# Patient Record
Sex: MALE | Race: WHITE | Employment: OTHER
[De-identification: names, ages, dates, MRNs, and addresses within clinical notes are randomized per-mention and may not be internally consistent; named-entity substitution may affect disease eponyms.]

---

## 2017-01-30 RX ORDER — ALLOPURINOL 100 MG/1
TABLET ORAL
Qty: 180 TABLET | Refills: 3 | Status: SHIPPED | OUTPATIENT
Start: 2017-01-30 | End: 2018-05-01 | Stop reason: SDUPTHER

## 2017-02-10 LAB
BUN BLDV-MCNC: 62 MG/DL
CALCIUM SERPL-MCNC: 9.3 MG/DL
CHLORIDE BLD-SCNC: 105 MMOL/L
CO2: 29 MMOL/L
CREAT SERPL-MCNC: 2.31 MG/DL
GFR CALCULATED: NORMAL
GLUCOSE BLD-MCNC: 89 MG/DL
POTASSIUM SERPL-SCNC: 4.7 MMOL/L
SODIUM BLD-SCNC: 142 MMOL/L

## 2017-02-13 DIAGNOSIS — N18.4 SECONDARY DIABETES MELLITUS WITH STAGE 4 CHRONIC KIDNEY DISEASE (HCC): ICD-10-CM

## 2017-02-13 DIAGNOSIS — N18.4 CKD (CHRONIC KIDNEY DISEASE) STAGE 4, GFR 15-29 ML/MIN (HCC): ICD-10-CM

## 2017-02-13 DIAGNOSIS — I12.9 BENIGN HYPERTENSION WITH CKD (CHRONIC KIDNEY DISEASE) STAGE IV (HCC): ICD-10-CM

## 2017-02-13 DIAGNOSIS — E13.22 SECONDARY DIABETES MELLITUS WITH STAGE 4 CHRONIC KIDNEY DISEASE (HCC): ICD-10-CM

## 2017-02-13 DIAGNOSIS — N18.4 BENIGN HYPERTENSION WITH CKD (CHRONIC KIDNEY DISEASE) STAGE IV (HCC): ICD-10-CM

## 2017-02-17 ENCOUNTER — OFFICE VISIT (OUTPATIENT)
Facility: CLINIC | Age: 77
End: 2017-02-17

## 2017-02-17 VITALS
HEART RATE: 50 BPM | RESPIRATION RATE: 20 BRPM | OXYGEN SATURATION: 95 % | BODY MASS INDEX: 36.59 KG/M2 | SYSTOLIC BLOOD PRESSURE: 136 MMHG | DIASTOLIC BLOOD PRESSURE: 76 MMHG | WEIGHT: 255 LBS

## 2017-02-17 DIAGNOSIS — E78.00 PURE HYPERCHOLESTEROLEMIA: ICD-10-CM

## 2017-02-17 DIAGNOSIS — I10 ESSENTIAL HYPERTENSION: Primary | ICD-10-CM

## 2017-02-17 DIAGNOSIS — M1A.9XX0 CHRONIC GOUT WITHOUT TOPHUS, UNSPECIFIED CAUSE, UNSPECIFIED SITE: ICD-10-CM

## 2017-02-17 DIAGNOSIS — E87.5 HYPERKALEMIA: ICD-10-CM

## 2017-02-17 DIAGNOSIS — E11.8 TYPE 2 DIABETES MELLITUS WITH COMPLICATION, WITH LONG-TERM CURRENT USE OF INSULIN (HCC): ICD-10-CM

## 2017-02-17 DIAGNOSIS — N18.4 CKD (CHRONIC KIDNEY DISEASE) STAGE 4, GFR 15-29 ML/MIN (HCC): ICD-10-CM

## 2017-02-17 DIAGNOSIS — Z79.4 TYPE 2 DIABETES MELLITUS WITH COMPLICATION, WITH LONG-TERM CURRENT USE OF INSULIN (HCC): ICD-10-CM

## 2017-02-17 DIAGNOSIS — D50.9 IRON DEFICIENCY ANEMIA, UNSPECIFIED IRON DEFICIENCY ANEMIA TYPE: ICD-10-CM

## 2017-02-17 DIAGNOSIS — J44.9 CHRONIC OBSTRUCTIVE PULMONARY DISEASE, UNSPECIFIED COPD TYPE (HCC): ICD-10-CM

## 2017-02-17 LAB — HBA1C MFR BLD: 7.8 %

## 2017-02-17 PROCEDURE — 83036 HEMOGLOBIN GLYCOSYLATED A1C: CPT | Performed by: FAMILY MEDICINE

## 2017-02-17 PROCEDURE — 99214 OFFICE O/P EST MOD 30 MIN: CPT | Performed by: FAMILY MEDICINE

## 2017-02-17 ASSESSMENT — ENCOUNTER SYMPTOMS
VOMITING: 0
NAUSEA: 0
DIARRHEA: 0
ABDOMINAL PAIN: 0
CONSTIPATION: 0
RHINORRHEA: 0
SHORTNESS OF BREATH: 0
COUGH: 0
ABDOMINAL DISTENTION: 0
CHEST TIGHTNESS: 0
BACK PAIN: 0
SORE THROAT: 0

## 2017-03-06 RX ORDER — SPIRONOLACTONE 25 MG/1
25 TABLET ORAL DAILY
Qty: 90 TABLET | Refills: 3 | Status: SHIPPED | OUTPATIENT
Start: 2017-03-06 | End: 2017-03-07 | Stop reason: SDUPTHER

## 2017-03-07 RX ORDER — SPIRONOLACTONE 25 MG/1
25 TABLET ORAL DAILY
Qty: 90 TABLET | Refills: 3 | Status: SHIPPED | OUTPATIENT
Start: 2017-03-07 | End: 2018-06-26 | Stop reason: SDUPTHER

## 2017-11-09 DIAGNOSIS — I10 ESSENTIAL HYPERTENSION: ICD-10-CM

## 2018-05-01 RX ORDER — ALLOPURINOL 100 MG/1
TABLET ORAL
Qty: 180 TABLET | Refills: 3 | Status: SHIPPED | OUTPATIENT
Start: 2018-05-01 | End: 2019-04-10 | Stop reason: SDUPTHER

## 2018-05-03 PROBLEM — N18.30 CKD (CHRONIC KIDNEY DISEASE) STAGE 3, GFR 30-59 ML/MIN (HCC): Status: ACTIVE | Noted: 2018-05-03

## 2018-06-06 PROBLEM — I48.0 PAROXYSMAL ATRIAL FIBRILLATION (HCC): Status: ACTIVE | Noted: 2018-06-06

## 2018-06-06 PROBLEM — Z95.0 STATUS POST PLACEMENT OF CARDIAC PACEMAKER: Status: ACTIVE | Noted: 2018-06-06

## 2018-06-06 PROBLEM — I50.32 DIASTOLIC DYSFUNCTION WITH CHRONIC HEART FAILURE (HCC): Status: ACTIVE | Noted: 2018-06-06

## 2018-06-06 PROBLEM — I35.0 NONRHEUMATIC AORTIC VALVE STENOSIS: Status: ACTIVE | Noted: 2018-06-06

## 2018-06-26 RX ORDER — SPIRONOLACTONE 25 MG/1
25 TABLET ORAL DAILY
Qty: 90 TABLET | Refills: 3 | Status: SHIPPED | OUTPATIENT
Start: 2018-06-26 | End: 2019-06-12 | Stop reason: ALTCHOICE

## 2018-07-06 ENCOUNTER — OFFICE VISIT (OUTPATIENT)
Dept: FAMILY MEDICINE CLINIC | Age: 78
End: 2018-07-06
Payer: COMMERCIAL

## 2018-07-06 VITALS
BODY MASS INDEX: 36.01 KG/M2 | DIASTOLIC BLOOD PRESSURE: 64 MMHG | OXYGEN SATURATION: 98 % | RESPIRATION RATE: 16 BRPM | SYSTOLIC BLOOD PRESSURE: 126 MMHG | WEIGHT: 251 LBS | TEMPERATURE: 98.4 F | HEART RATE: 84 BPM

## 2018-07-06 DIAGNOSIS — E11.8 TYPE 2 DIABETES MELLITUS WITH COMPLICATION, WITH LONG-TERM CURRENT USE OF INSULIN (HCC): ICD-10-CM

## 2018-07-06 DIAGNOSIS — M1A.9XX0 CHRONIC GOUT WITHOUT TOPHUS, UNSPECIFIED CAUSE, UNSPECIFIED SITE: ICD-10-CM

## 2018-07-06 DIAGNOSIS — E78.00 PURE HYPERCHOLESTEROLEMIA: ICD-10-CM

## 2018-07-06 DIAGNOSIS — I50.32 DIASTOLIC DYSFUNCTION WITH CHRONIC HEART FAILURE (HCC): ICD-10-CM

## 2018-07-06 DIAGNOSIS — Z79.4 TYPE 2 DIABETES MELLITUS WITH COMPLICATION, WITH LONG-TERM CURRENT USE OF INSULIN (HCC): ICD-10-CM

## 2018-07-06 DIAGNOSIS — I35.1 NONRHEUMATIC AORTIC VALVE INSUFFICIENCY: ICD-10-CM

## 2018-07-06 DIAGNOSIS — D50.9 IRON DEFICIENCY ANEMIA, UNSPECIFIED IRON DEFICIENCY ANEMIA TYPE: ICD-10-CM

## 2018-07-06 DIAGNOSIS — I10 ESSENTIAL HYPERTENSION: Primary | ICD-10-CM

## 2018-07-06 DIAGNOSIS — Z95.0 STATUS POST PLACEMENT OF CARDIAC PACEMAKER: ICD-10-CM

## 2018-07-06 DIAGNOSIS — J44.9 CHRONIC OBSTRUCTIVE PULMONARY DISEASE, UNSPECIFIED COPD TYPE (HCC): ICD-10-CM

## 2018-07-06 DIAGNOSIS — N18.30 CKD (CHRONIC KIDNEY DISEASE) STAGE 3, GFR 30-59 ML/MIN (HCC): ICD-10-CM

## 2018-07-06 DIAGNOSIS — I48.0 PAROXYSMAL ATRIAL FIBRILLATION (HCC): ICD-10-CM

## 2018-07-06 DIAGNOSIS — I35.0 NONRHEUMATIC AORTIC VALVE STENOSIS: ICD-10-CM

## 2018-07-06 PROCEDURE — 99214 OFFICE O/P EST MOD 30 MIN: CPT | Performed by: FAMILY MEDICINE

## 2018-07-06 ASSESSMENT — ENCOUNTER SYMPTOMS
VOMITING: 0
COUGH: 0
DIARRHEA: 0
RHINORRHEA: 0
BACK PAIN: 0
NAUSEA: 0
CHEST TIGHTNESS: 0
ABDOMINAL DISTENTION: 0
SORE THROAT: 0
SHORTNESS OF BREATH: 1
CONSTIPATION: 0
ABDOMINAL PAIN: 0

## 2018-07-06 NOTE — PROGRESS NOTES
(1 of 2 - 2 Dose Series) 09/05/2018 (Originally 5/14/1988)    Diabetic foot exam  12/07/2018 (Originally 8/30/2014)    DTaP/Tdap/Td vaccine (1 - Tdap) 11/18/2021 (Originally 5/14/1959)    Flu vaccine (1) 09/01/2018    Potassium monitoring  05/31/2019    Creatinine monitoring  05/31/2019    Pneumococcal high/highest risk  Completed     Patient/Caregiver verbalize understanding of medications. Yes    Patient ID: Latosha Ayers is a [de-identified] y.o. male. HPI  Pt here today for follow up on his DM, gout, HTN, COPD, CKD and general follow up after his NILO. He states he did see improvement in his right foot after starting the Prednisone. He still has some mild swelling, but no pain. His BS's were elevated while on the Prednisone, but now back down in the 100-130's. He is taking 40U of the Levemir. He has an appt with Dr. Perkins Ramez the end of the month to go over the NILO. He is using the Anora and hasn't seen much difference in his breathing. Pt denies any fever or chills. Pt denies any N/V/D/C or abdominal pain. Pt today denies any HA, chest pain, but does get SOB. Otherwise pt doing well on current tx and no other concerns today. The patient's past medical, surgical, social, and family history as well as his current medications and allergies were reviewed as documented in today's encounter.       Current Outpatient Prescriptions   Medication Sig Dispense Refill    blood glucose test strips (TRUE METRIX BLOOD GLUCOSE TEST) strip 1 each by In Vitro route daily Testing 1 time daily 200 each 11    ONE TOUCH LANCETS MISC Daily and as needed  Diagnosis 250.0 200 each 11    spironolactone (ALDACTONE) 25 MG tablet Take 1 tablet by mouth daily 90 tablet 3    amLODIPine (NORVASC) 5 MG tablet Take 1 tablet by mouth daily Per Dr. Asia Quezada 90 tablet 3    umeclidinium-vilanterol (ANORO ELLIPTA) 62.5-25 MCG/INH AEPB inhaler Inhale 1 puff into the lungs daily 1 each 11    insulin detemir (LEVEMIR FLEXTOUCH) 100 UNIT/ML injection pen Inject 40 Units into the skin nightly      colchicine (COLCRYS) 0.6 MG tablet Take 1 tab BID prn gout attacks 30 tablet 3    apixaban (ELIQUIS) 2.5 MG TABS tablet Take 2.5 mg by mouth 2 times daily      allopurinol (ZYLOPRIM) 100 MG tablet TAKE 2 TABLETS DAILY 180 tablet 3    hydrALAZINE (APRESOLINE) 100 MG tablet Take 1 tablet by mouth 2 times daily 180 tablet 3    atorvastatin (LIPITOR) 20 MG tablet Take 1 tablet by mouth daily 30 tablet 11    furosemide (LASIX) 40 MG tablet Take 1 tablet by mouth daily 90 tablet 3     No current facility-administered medications for this visit. Review of Systems   Constitutional: Negative for appetite change, fatigue and fever. HENT: Negative for congestion, ear pain, rhinorrhea and sore throat. Respiratory: Positive for shortness of breath. Negative for cough and chest tightness. Cardiovascular: Negative for chest pain and palpitations. Gastrointestinal: Negative for abdominal distention, abdominal pain, constipation, diarrhea, nausea and vomiting. Genitourinary: Negative for difficulty urinating and dysuria. Musculoskeletal: Positive for joint swelling (right foot). Negative for arthralgias, back pain and myalgias. Skin: Negative for rash. Neurological: Negative for dizziness, weakness, light-headedness and headaches. Hematological: Negative for adenopathy. Psychiatric/Behavioral: Negative for behavioral problems and sleep disturbance. The patient is not nervous/anxious. Objective:   Physical Exam   Constitutional: He is oriented to person, place, and time. He appears well-developed. No distress. HENT:   Head: Normocephalic and atraumatic. Right Ear: External ear normal.   Left Ear: External ear normal.   Nose: Nose normal.   Mouth/Throat: Oropharynx is clear and moist. No oropharyngeal exudate. Eyes: Conjunctivae and EOM are normal. Pupils are equal, round, and reactive to light. Neck: Normal range of motion. Cardiovascular: Normal rate, regular rhythm and intact distal pulses. Murmur heard. Systolic murmur is present with a grade of 3/6   Pulmonary/Chest: Effort normal and breath sounds normal. No respiratory distress. He has no wheezes. He exhibits no tenderness. Abdominal: Soft. Bowel sounds are normal. He exhibits no distension and no mass. There is no tenderness. Musculoskeletal: Normal range of motion. He exhibits no edema or tenderness. Lymphadenopathy:     He has no cervical adenopathy. Neurological: He is alert and oriented to person, place, and time. He has normal reflexes. Skin: No rash noted. Psychiatric: He has a normal mood and affect. His behavior is normal.   Vitals reviewed. Assessment:       Diagnosis Orders   1. Essential hypertension  CBC    Basic Metabolic Panel   2. Pure hypercholesterolemia     3. Type 2 diabetes mellitus with complication, with long-term current use of insulin (HCC)  Basic Metabolic Panel    Hemoglobin A1C   4. Diastolic dysfunction with chronic heart failure (HCC)  Basic Metabolic Panel   5. Paroxysmal atrial fibrillation (HCC)     6. Chronic obstructive pulmonary disease, unspecified COPD type ([address]  Basic Metabolic Panel   7. Iron deficiency anemia, unspecified iron deficiency anemia type  CBC   8. Chronic gout without tophus, unspecified cause, unspecified site  Uric Acid   9. CKD (chronic kidney disease) stage 3, GFR 30-59 ml/min  Basic Metabolic Panel   10. Status post placement of cardiac pacemaker     11. Nonrheumatic aortic valve stenosis     12.  Nonrheumatic aortic valve insufficiency             Plan:      Orders Placed This Encounter   Procedures    CBC     Standing Status:   Future     Standing Expiration Date:   7/6/2019    Basic Metabolic Panel     Standing Status:   Future     Standing Expiration Date:   7/6/2019    Hemoglobin A1C     Standing Status:   Future     Standing Expiration Date:   7/6/2019    Uric Acid     Standing Status: Future     Standing Expiration Date:   2019      Orders Placed This Encounter   Medications    blood glucose test strips (TRUE METRIX BLOOD GLUCOSE TEST) strip     Si each by In Vitro route daily Testing 1 time daily     Dispense:  200 each     Refill:  11    ONE TOUCH LANCETS MISC     Sig: Daily and as needed  Diagnosis 250.0     Dispense:  200 each     Refill:  11     Continue with ADA diet, current diabetic meds, and monitoring BS's as instructed and will cont with the Levemir 40u daily and will call with BS's in 2 weeks    Increase fluids throughout the day    Will cont with the Anora daily for his COPD    Will cont to follow with Dr. Amarilis Bowen as instructed the end of the month to go over his NILO    Will cont to follow with Dr. Cassandra Zuniga as instructed    Rest of systems unchanged, continue current treatments. Medications, labs, diagnostic studies, consultations and follow-up as documented in this encounter.  Rest of systems unchanged, continue current treatments

## 2018-07-13 ENCOUNTER — TELEPHONE (OUTPATIENT)
Dept: FAMILY MEDICINE CLINIC | Age: 78
End: 2018-07-13

## 2018-07-13 NOTE — TELEPHONE ENCOUNTER
Patient called on Friday morning reporting his Blood sugar readings, he stated he was suppose to call with results. 7/8-121,7/9-175,7/10-93,7/,7/,7/13-96. These were before he ate when he awoke in the morning. He also states the high for the week on his blood pressure was 147/77 and the lowest reading was 118/65.

## 2018-07-26 ENCOUNTER — TELEPHONE (OUTPATIENT)
Dept: FAMILY MEDICINE CLINIC | Age: 78
End: 2018-07-26

## 2018-08-13 ENCOUNTER — TELEPHONE (OUTPATIENT)
Dept: FAMILY MEDICINE CLINIC | Age: 78
End: 2018-08-13

## 2018-08-14 NOTE — TELEPHONE ENCOUNTER
Patient came in for samples and I did discuss with him what the doctor said. I also asked that he let us know how his BS are doing.

## 2018-08-14 NOTE — TELEPHONE ENCOUNTER
I'm not really sure why they do not think cardiac related and due to his kidneys? When pt picks up his samples tell him to give us a call if the SOB worsens or if he sees no improvement with the new Stiolto Inhaler.

## 2018-08-15 LAB
BUN BLDV-MCNC: 58 MG/DL
CALCIUM SERPL-MCNC: 9.6 MG/DL
CHLORIDE BLD-SCNC: 101 MMOL/L
CO2: 28 MMOL/L
CREAT SERPL-MCNC: 2.34 MG/DL
GFR CALCULATED: 24
GLUCOSE BLD-MCNC: 139 MG/DL
POTASSIUM SERPL-SCNC: 4.2 MMOL/L
SODIUM BLD-SCNC: 139 MMOL/L

## 2018-08-16 DIAGNOSIS — N18.30 CKD (CHRONIC KIDNEY DISEASE) STAGE 3, GFR 30-59 ML/MIN (HCC): ICD-10-CM

## 2018-08-20 ENCOUNTER — TELEPHONE (OUTPATIENT)
Dept: FAMILY MEDICINE CLINIC | Age: 78
End: 2018-08-20

## 2018-08-21 NOTE — TELEPHONE ENCOUNTER
We did receive a faxed request for surgical clearance and it was filled out and faxed back to their office. Note attached asking that they also get cardiac clearance.

## 2018-08-27 RX ORDER — ATORVASTATIN CALCIUM 20 MG/1
20 TABLET, FILM COATED ORAL DAILY
Qty: 90 TABLET | Refills: 3 | Status: SHIPPED | OUTPATIENT
Start: 2018-08-27 | End: 2019-11-05 | Stop reason: SDUPTHER

## 2018-08-28 ENCOUNTER — TELEPHONE (OUTPATIENT)
Dept: FAMILY MEDICINE CLINIC | Age: 78
End: 2018-08-28

## 2018-08-28 NOTE — TELEPHONE ENCOUNTER
Patient requesting a callback to discuss medications. Patient states if you don't get him please leave a message.

## 2018-09-19 PROBLEM — N18.30 CKD (CHRONIC KIDNEY DISEASE) STAGE 3, GFR 30-59 ML/MIN (HCC): Status: RESOLVED | Noted: 2018-05-03 | Resolved: 2018-09-19

## 2018-10-01 LAB
AVERAGE GLUCOSE: 163
BASOPHILS ABSOLUTE: NORMAL /ΜL
BASOPHILS RELATIVE PERCENT: NORMAL %
EOSINOPHILS ABSOLUTE: NORMAL /ΜL
EOSINOPHILS RELATIVE PERCENT: NORMAL %
HBA1C MFR BLD: 7.3 %
HCT VFR BLD CALC: 41.2 % (ref 41–53)
HEMOGLOBIN: 13.9 G/DL (ref 13.5–17.5)
LYMPHOCYTES ABSOLUTE: NORMAL /ΜL
LYMPHOCYTES RELATIVE PERCENT: NORMAL %
MCH RBC QN AUTO: NORMAL PG
MCHC RBC AUTO-ENTMCNC: NORMAL G/DL
MCV RBC AUTO: NORMAL FL
MONOCYTES ABSOLUTE: NORMAL /ΜL
MONOCYTES RELATIVE PERCENT: NORMAL %
NEUTROPHILS ABSOLUTE: NORMAL /ΜL
NEUTROPHILS RELATIVE PERCENT: NORMAL %
PLATELET # BLD: 185 K/ΜL
PMV BLD AUTO: NORMAL FL
RBC # BLD: NORMAL 10^6/ΜL
URIC ACID: 7.8
WBC # BLD: 9.2 10^3/ML

## 2018-10-02 DIAGNOSIS — Z79.4 TYPE 2 DIABETES MELLITUS WITH COMPLICATION, WITH LONG-TERM CURRENT USE OF INSULIN (HCC): ICD-10-CM

## 2018-10-02 DIAGNOSIS — E11.8 TYPE 2 DIABETES MELLITUS WITH COMPLICATION, WITH LONG-TERM CURRENT USE OF INSULIN (HCC): ICD-10-CM

## 2018-10-02 DIAGNOSIS — M1A.9XX0 CHRONIC GOUT WITHOUT TOPHUS, UNSPECIFIED CAUSE, UNSPECIFIED SITE: ICD-10-CM

## 2018-10-02 DIAGNOSIS — D50.9 IRON DEFICIENCY ANEMIA, UNSPECIFIED IRON DEFICIENCY ANEMIA TYPE: ICD-10-CM

## 2018-10-02 DIAGNOSIS — I10 ESSENTIAL HYPERTENSION: ICD-10-CM

## 2018-10-10 ENCOUNTER — OFFICE VISIT (OUTPATIENT)
Dept: FAMILY MEDICINE CLINIC | Age: 78
End: 2018-10-10
Payer: COMMERCIAL

## 2018-10-10 VITALS
HEART RATE: 96 BPM | WEIGHT: 247 LBS | RESPIRATION RATE: 18 BRPM | BODY MASS INDEX: 35.44 KG/M2 | TEMPERATURE: 98.5 F | DIASTOLIC BLOOD PRESSURE: 76 MMHG | OXYGEN SATURATION: 95 % | SYSTOLIC BLOOD PRESSURE: 118 MMHG

## 2018-10-10 DIAGNOSIS — J44.9 CHRONIC OBSTRUCTIVE PULMONARY DISEASE, UNSPECIFIED COPD TYPE (HCC): ICD-10-CM

## 2018-10-10 DIAGNOSIS — M1A.9XX0 CHRONIC GOUT WITHOUT TOPHUS, UNSPECIFIED CAUSE, UNSPECIFIED SITE: ICD-10-CM

## 2018-10-10 DIAGNOSIS — E78.00 PURE HYPERCHOLESTEROLEMIA: ICD-10-CM

## 2018-10-10 DIAGNOSIS — E11.8 TYPE 2 DIABETES MELLITUS WITH COMPLICATION, WITH LONG-TERM CURRENT USE OF INSULIN (HCC): ICD-10-CM

## 2018-10-10 DIAGNOSIS — Z23 NEED FOR PROPHYLACTIC VACCINATION AND INOCULATION AGAINST INFLUENZA: ICD-10-CM

## 2018-10-10 DIAGNOSIS — Z79.4 TYPE 2 DIABETES MELLITUS WITH COMPLICATION, WITH LONG-TERM CURRENT USE OF INSULIN (HCC): ICD-10-CM

## 2018-10-10 DIAGNOSIS — I35.1 NONRHEUMATIC AORTIC VALVE INSUFFICIENCY: ICD-10-CM

## 2018-10-10 DIAGNOSIS — I35.0 NONRHEUMATIC AORTIC VALVE STENOSIS: ICD-10-CM

## 2018-10-10 DIAGNOSIS — I10 ESSENTIAL HYPERTENSION: Primary | ICD-10-CM

## 2018-10-10 DIAGNOSIS — D50.9 IRON DEFICIENCY ANEMIA, UNSPECIFIED IRON DEFICIENCY ANEMIA TYPE: ICD-10-CM

## 2018-10-10 DIAGNOSIS — I48.0 PAROXYSMAL ATRIAL FIBRILLATION (HCC): ICD-10-CM

## 2018-10-10 DIAGNOSIS — I50.32 DIASTOLIC DYSFUNCTION WITH CHRONIC HEART FAILURE (HCC): ICD-10-CM

## 2018-10-10 PROCEDURE — 99214 OFFICE O/P EST MOD 30 MIN: CPT | Performed by: FAMILY MEDICINE

## 2018-10-10 PROCEDURE — 90662 IIV NO PRSV INCREASED AG IM: CPT | Performed by: FAMILY MEDICINE

## 2018-10-10 PROCEDURE — G0008 ADMIN INFLUENZA VIRUS VAC: HCPCS | Performed by: FAMILY MEDICINE

## 2018-10-10 ASSESSMENT — ENCOUNTER SYMPTOMS
SHORTNESS OF BREATH: 1
DIARRHEA: 0
CONSTIPATION: 0
CHEST TIGHTNESS: 0
VOMITING: 0
RHINORRHEA: 0
ABDOMINAL DISTENTION: 0
ABDOMINAL PAIN: 0
BACK PAIN: 0
NAUSEA: 0
COUGH: 0
SORE THROAT: 0

## 2018-10-10 NOTE — PROGRESS NOTES
HYPERTENSION visit     BP Readings from Last 3 Encounters:   10/10/18 118/76   09/19/18 118/80   07/06/18 126/64       LDL Calculated (mg/dL)   Date Value   05/31/2018 58   05/31/2018 58     HDL (mg/dL)   Date Value   05/31/2018 36   05/31/2018 36     BUN (mg/dL)   Date Value   10/01/2018 55     CREATININE (no units)   Date Value   10/01/2018 2.57     Glucose (mg/dL)   Date Value   10/01/2018 81              Have you changed or started any medications since your last visit including any over-the-counter medicines, vitamins, or herbal medicines? yes - see med list   Have you stopped taking any of your medications? Is so, why? -  no  Are you having any side effects from any of your medications? - no  How often do you miss doses of your medication? rare      Have you seen any other physician or provider since your last visit? yes - Isaías Ramirez   Have you had any other diagnostic tests since your last visit? yes - labs   Have you been seen in the emergency room and/or had an admission in a hospital since we last saw you?  no   Have you had your routine dental cleaning in the past 6 months?  no     Do you have an active MyChart account? If no, what is the barrier?   Yes    Patient Care Team:  Tani Doyle MD as PCP - General (Family Medicine)  Jennifer Cruz MD as Consulting Physician (Nephrology)  Fransisco Wu MD as Consulting Physician (Urology)  Vicky Barclay DPM as Consulting Physician (Podiatry)  Miguel Herrmann MD as Consulting Physician (Cardiology)  Chavo Gibson MD as Consulting Physician (Internal Medicine Cardiovascular Disease)  Abimbola Leggett MD as Consulting Physician (Cardiology)    Medical History Review  Past Medical, Family, and Social History reviewed and does contribute to the patient presenting condition    Health Maintenance   Topic Date Due    Diabetic foot exam  12/07/2018 (Originally 8/30/2014)    Shingles Vaccine (1 of 2 - 2 Dose Series)

## 2018-10-17 ENCOUNTER — TELEPHONE (OUTPATIENT)
Dept: FAMILY MEDICINE CLINIC | Age: 78
End: 2018-10-17

## 2018-10-23 ENCOUNTER — TELEPHONE (OUTPATIENT)
Dept: FAMILY MEDICINE CLINIC | Age: 78
End: 2018-10-23

## 2018-12-06 ENCOUNTER — TELEPHONE (OUTPATIENT)
Dept: FAMILY MEDICINE CLINIC | Age: 78
End: 2018-12-06

## 2018-12-06 DIAGNOSIS — N18.4 CHRONIC KIDNEY DISEASE (CKD), STAGE IV (SEVERE) (HCC): Primary | ICD-10-CM

## 2018-12-06 PROBLEM — R09.89 CAROTID BRUIT: Status: ACTIVE | Noted: 2018-07-12

## 2018-12-06 PROBLEM — E16.2 HYPOGLYCEMIA: Status: ACTIVE | Noted: 2018-04-13

## 2018-12-06 PROBLEM — N17.9 ACUTE RENAL FAILURE (HCC): Status: ACTIVE | Noted: 2018-07-12

## 2018-12-06 PROBLEM — I50.9 ACUTE EXACERBATION OF CHF (CONGESTIVE HEART FAILURE) (HCC): Status: ACTIVE | Noted: 2018-10-13

## 2018-12-06 PROBLEM — I44.39 HIGH DEGREE ATRIOVENTRICULAR BLOCK: Status: ACTIVE | Noted: 2018-04-30

## 2018-12-06 PROBLEM — I24.9 ACUTE CORONARY SYNDROME (HCC): Status: ACTIVE | Noted: 2018-07-12

## 2018-12-06 PROBLEM — I44.2 COMPLETE HEART BLOCK (HCC): Status: ACTIVE | Noted: 2018-04-15

## 2018-12-06 PROBLEM — Z95.0 PRESENCE OF CARDIAC PACEMAKER: Status: ACTIVE | Noted: 2018-05-17

## 2018-12-07 DIAGNOSIS — N18.4 CHRONIC KIDNEY DISEASE (CKD), STAGE IV (SEVERE) (HCC): ICD-10-CM

## 2018-12-07 LAB
BUN BLDV-MCNC: 65 MG/DL
CALCIUM SERPL-MCNC: 9.8 MG/DL
CHLORIDE BLD-SCNC: 102 MMOL/L
CO2: 27 MMOL/L
CREAT SERPL-MCNC: 2.29 MG/DL
GFR CALCULATED: NORMAL
GLUCOSE BLD-MCNC: 100 MG/DL
POTASSIUM SERPL-SCNC: 4.9 MMOL/L
SODIUM BLD-SCNC: 138 MMOL/L

## 2019-01-10 ENCOUNTER — OFFICE VISIT (OUTPATIENT)
Dept: FAMILY MEDICINE CLINIC | Age: 79
End: 2019-01-10
Payer: COMMERCIAL

## 2019-01-10 VITALS
SYSTOLIC BLOOD PRESSURE: 112 MMHG | RESPIRATION RATE: 16 BRPM | DIASTOLIC BLOOD PRESSURE: 64 MMHG | HEART RATE: 58 BPM | WEIGHT: 235 LBS | BODY MASS INDEX: 33.72 KG/M2 | TEMPERATURE: 97.7 F | OXYGEN SATURATION: 98 %

## 2019-01-10 DIAGNOSIS — N18.4 CHRONIC KIDNEY DISEASE (CKD), STAGE IV (SEVERE) (HCC): ICD-10-CM

## 2019-01-10 DIAGNOSIS — I65.22 LEFT CAROTID STENOSIS: ICD-10-CM

## 2019-01-10 DIAGNOSIS — D50.9 IRON DEFICIENCY ANEMIA, UNSPECIFIED IRON DEFICIENCY ANEMIA TYPE: ICD-10-CM

## 2019-01-10 DIAGNOSIS — Z79.4 TYPE 2 DIABETES MELLITUS WITH COMPLICATION, WITH LONG-TERM CURRENT USE OF INSULIN (HCC): ICD-10-CM

## 2019-01-10 DIAGNOSIS — I35.0 NONRHEUMATIC AORTIC VALVE STENOSIS: ICD-10-CM

## 2019-01-10 DIAGNOSIS — I35.1 NONRHEUMATIC AORTIC VALVE INSUFFICIENCY: ICD-10-CM

## 2019-01-10 DIAGNOSIS — I10 ESSENTIAL HYPERTENSION: Primary | ICD-10-CM

## 2019-01-10 DIAGNOSIS — J44.9 CHRONIC OBSTRUCTIVE PULMONARY DISEASE, UNSPECIFIED COPD TYPE (HCC): ICD-10-CM

## 2019-01-10 DIAGNOSIS — E11.8 TYPE 2 DIABETES MELLITUS WITH COMPLICATION, WITH LONG-TERM CURRENT USE OF INSULIN (HCC): ICD-10-CM

## 2019-01-10 DIAGNOSIS — E78.00 PURE HYPERCHOLESTEROLEMIA: ICD-10-CM

## 2019-01-10 DIAGNOSIS — M1A.9XX0 CHRONIC GOUT WITHOUT TOPHUS, UNSPECIFIED CAUSE, UNSPECIFIED SITE: ICD-10-CM

## 2019-01-10 DIAGNOSIS — I25.10 CORONARY ARTERY DISEASE INVOLVING NATIVE CORONARY ARTERY OF NATIVE HEART WITHOUT ANGINA PECTORIS: ICD-10-CM

## 2019-01-10 DIAGNOSIS — I48.0 PAF (PAROXYSMAL ATRIAL FIBRILLATION) (HCC): ICD-10-CM

## 2019-01-10 DIAGNOSIS — Z95.0 STATUS POST PLACEMENT OF CARDIAC PACEMAKER: ICD-10-CM

## 2019-01-10 DIAGNOSIS — I50.32 DIASTOLIC DYSFUNCTION WITH CHRONIC HEART FAILURE (HCC): ICD-10-CM

## 2019-01-10 PROBLEM — I24.9 ACUTE CORONARY SYNDROME (HCC): Status: RESOLVED | Noted: 2018-07-12 | Resolved: 2019-01-10

## 2019-01-10 PROBLEM — I44.2 COMPLETE HEART BLOCK (HCC): Status: RESOLVED | Noted: 2018-04-15 | Resolved: 2019-01-10

## 2019-01-10 PROBLEM — I44.39 HIGH DEGREE ATRIOVENTRICULAR BLOCK: Status: RESOLVED | Noted: 2018-04-30 | Resolved: 2019-01-10

## 2019-01-10 PROBLEM — N17.9 ACUTE RENAL FAILURE (HCC): Status: RESOLVED | Noted: 2018-07-12 | Resolved: 2019-01-10

## 2019-01-10 PROBLEM — R09.89 CAROTID BRUIT: Status: RESOLVED | Noted: 2018-07-12 | Resolved: 2019-01-10

## 2019-01-10 PROBLEM — E16.2 HYPOGLYCEMIA: Status: RESOLVED | Noted: 2018-04-13 | Resolved: 2019-01-10

## 2019-01-10 PROBLEM — I50.9 ACUTE EXACERBATION OF CHF (CONGESTIVE HEART FAILURE) (HCC): Status: RESOLVED | Noted: 2018-10-13 | Resolved: 2019-01-10

## 2019-01-10 PROCEDURE — 99214 OFFICE O/P EST MOD 30 MIN: CPT | Performed by: FAMILY MEDICINE

## 2019-01-10 RX ORDER — METOPROLOL SUCCINATE 25 MG/1
25 TABLET, EXTENDED RELEASE ORAL DAILY
COMMUNITY
End: 2019-01-10 | Stop reason: SDUPTHER

## 2019-01-10 RX ORDER — METOPROLOL SUCCINATE 25 MG/1
25 TABLET, EXTENDED RELEASE ORAL DAILY
Qty: 90 TABLET | Refills: 3 | Status: SHIPPED | OUTPATIENT
Start: 2019-01-10 | End: 2020-04-03 | Stop reason: SDUPTHER

## 2019-01-10 RX ORDER — HYDRALAZINE HYDROCHLORIDE 50 MG/1
50 TABLET, FILM COATED ORAL 2 TIMES DAILY
Qty: 180 TABLET | Refills: 3 | Status: SHIPPED | OUTPATIENT
Start: 2019-01-10 | End: 2019-06-12 | Stop reason: ALTCHOICE

## 2019-01-10 RX ORDER — HYDRALAZINE HYDROCHLORIDE 50 MG/1
50 TABLET, FILM COATED ORAL 2 TIMES DAILY
COMMUNITY
End: 2019-01-10 | Stop reason: SDUPTHER

## 2019-01-10 ASSESSMENT — ENCOUNTER SYMPTOMS
ABDOMINAL PAIN: 0
SHORTNESS OF BREATH: 1
BACK PAIN: 0
RHINORRHEA: 0
DIARRHEA: 0
VOMITING: 0
CONSTIPATION: 0
NAUSEA: 0
COUGH: 0
CHEST TIGHTNESS: 0
ABDOMINAL DISTENTION: 0
SORE THROAT: 0

## 2019-01-15 ENCOUNTER — TELEPHONE (OUTPATIENT)
Dept: FAMILY MEDICINE CLINIC | Age: 79
End: 2019-01-15

## 2019-02-13 ENCOUNTER — TELEPHONE (OUTPATIENT)
Dept: FAMILY MEDICINE CLINIC | Age: 79
End: 2019-02-13

## 2019-02-15 ENCOUNTER — TELEPHONE (OUTPATIENT)
Dept: FAMILY MEDICINE CLINIC | Age: 79
End: 2019-02-15

## 2019-04-10 ENCOUNTER — OFFICE VISIT (OUTPATIENT)
Dept: FAMILY MEDICINE CLINIC | Age: 79
End: 2019-04-10
Payer: COMMERCIAL

## 2019-04-10 VITALS
OXYGEN SATURATION: 96 % | BODY MASS INDEX: 34.44 KG/M2 | WEIGHT: 240 LBS | SYSTOLIC BLOOD PRESSURE: 104 MMHG | TEMPERATURE: 97.7 F | HEART RATE: 66 BPM | DIASTOLIC BLOOD PRESSURE: 58 MMHG | RESPIRATION RATE: 18 BRPM

## 2019-04-10 DIAGNOSIS — D50.9 IRON DEFICIENCY ANEMIA, UNSPECIFIED IRON DEFICIENCY ANEMIA TYPE: ICD-10-CM

## 2019-04-10 DIAGNOSIS — N18.4 CHRONIC KIDNEY DISEASE (CKD), STAGE IV (SEVERE) (HCC): ICD-10-CM

## 2019-04-10 DIAGNOSIS — I10 ESSENTIAL HYPERTENSION: Primary | ICD-10-CM

## 2019-04-10 DIAGNOSIS — M1A.9XX0 CHRONIC GOUT WITHOUT TOPHUS, UNSPECIFIED CAUSE, UNSPECIFIED SITE: ICD-10-CM

## 2019-04-10 DIAGNOSIS — I65.22 LEFT CAROTID STENOSIS: ICD-10-CM

## 2019-04-10 DIAGNOSIS — Z79.4 TYPE 2 DIABETES MELLITUS WITH COMPLICATION, WITH LONG-TERM CURRENT USE OF INSULIN (HCC): ICD-10-CM

## 2019-04-10 DIAGNOSIS — I25.10 CORONARY ARTERY DISEASE INVOLVING NATIVE CORONARY ARTERY OF NATIVE HEART WITHOUT ANGINA PECTORIS: ICD-10-CM

## 2019-04-10 DIAGNOSIS — E78.00 PURE HYPERCHOLESTEROLEMIA: ICD-10-CM

## 2019-04-10 DIAGNOSIS — I48.0 PAF (PAROXYSMAL ATRIAL FIBRILLATION) (HCC): ICD-10-CM

## 2019-04-10 DIAGNOSIS — E11.8 TYPE 2 DIABETES MELLITUS WITH COMPLICATION, WITH LONG-TERM CURRENT USE OF INSULIN (HCC): ICD-10-CM

## 2019-04-10 DIAGNOSIS — J44.9 CHRONIC OBSTRUCTIVE PULMONARY DISEASE, UNSPECIFIED COPD TYPE (HCC): ICD-10-CM

## 2019-04-10 DIAGNOSIS — I35.0 NONRHEUMATIC AORTIC VALVE STENOSIS: ICD-10-CM

## 2019-04-10 PROCEDURE — 99214 OFFICE O/P EST MOD 30 MIN: CPT | Performed by: FAMILY MEDICINE

## 2019-04-10 RX ORDER — AMLODIPINE BESYLATE 5 MG/1
5 TABLET ORAL DAILY
Qty: 90 TABLET | Refills: 3 | Status: SHIPPED | OUTPATIENT
Start: 2019-04-10 | End: 2019-06-12 | Stop reason: ALTCHOICE

## 2019-04-10 RX ORDER — ASPIRIN 81 MG/1
81 TABLET ORAL DAILY
COMMUNITY

## 2019-04-10 RX ORDER — ALLOPURINOL 100 MG/1
TABLET ORAL
Qty: 180 TABLET | Refills: 3 | Status: SHIPPED | OUTPATIENT
Start: 2019-04-10 | End: 2020-04-03 | Stop reason: SDUPTHER

## 2019-04-10 ASSESSMENT — ENCOUNTER SYMPTOMS
COUGH: 0
DIARRHEA: 0
ABDOMINAL DISTENTION: 0
RHINORRHEA: 0
VOMITING: 0
BACK PAIN: 1
CONSTIPATION: 0
SHORTNESS OF BREATH: 0
SORE THROAT: 0
ABDOMINAL PAIN: 0
CHEST TIGHTNESS: 0
NAUSEA: 0

## 2019-04-10 ASSESSMENT — PATIENT HEALTH QUESTIONNAIRE - PHQ9
SUM OF ALL RESPONSES TO PHQ9 QUESTIONS 1 & 2: 1
SUM OF ALL RESPONSES TO PHQ QUESTIONS 1-9: 1
1. LITTLE INTEREST OR PLEASURE IN DOING THINGS: 0
SUM OF ALL RESPONSES TO PHQ QUESTIONS 1-9: 1
2. FEELING DOWN, DEPRESSED OR HOPELESS: 1

## 2019-04-10 NOTE — PROGRESS NOTES
HYPERTENSION visit     BP Readings from Last 3 Encounters:   01/10/19 112/64   01/07/19 112/60   10/10/18 118/76       LDL Calculated (mg/dL)   Date Value   01/03/2019 56     HDL (mg/dL)   Date Value   01/03/2019 35     BUN (mg/dL)   Date Value   04/05/2019 75     CREATININE (no units)   Date Value   04/05/2019 2.68     Glucose (mg/dL)   Date Value   01/03/2019 181              Have you changed or started any medications since your last visit including any over-the-counter medicines, vitamins, or herbal medicines? no   Have you stopped taking any of your medications? Is so, why? -  no  Are you having any side effects from any of your medications? - no  How often do you miss doses of your medication? rare      Have you seen any other physician or provider since your last visit? yes - Kaleb Naqvi   Have you had any other diagnostic tests since your last visit? yes - labs   Have you been seen in the emergency room and/or had an admission in a hospital since we last saw you?  no   Have you had your routine dental cleaning in the past 6 months?  no     Do you have an active MyChart account? If no, what is the barrier?   Yes    Patient Care Team:  Sanchez Akhtar MD as PCP - General (Family Medicine)  Clement Calderon MD as Consulting Physician (Nephrology)  John Rodriguez MD as Consulting Physician (Urology)  Sammy Diallo DPM as Consulting Physician (Podiatry)  Janice Ahumada MD as Consulting Physician (Cardiology)  Dano Almonte MD as Consulting Physician (Internal Medicine Cardiovascular Disease)  Mark Meyer MD as Consulting Physician (Cardiology)  Ruby Rodriguez MD as Consulting Physician (Internal Medicine)  Sanchez Ga MD as Surgeon (Vascular Surgery)  Sara Diez MD as Surgeon (Internal Medicine Cardiovascular Disease)  Angela De La Fuente as Consulting Physician (Thoracic Surgery)    Medical History Review  Past Medical, Family, and Social History reviewed and does not contribute to the patient presenting condition    Health Maintenance   Topic Date Due    Shingles Vaccine (1 of 2) 10/10/2019 (Originally 5/14/1988)    Diabetic foot exam  04/10/2020 (Originally 8/30/2014)    DTaP/Tdap/Td vaccine (1 - Tdap) 11/18/2021 (Originally 5/14/1959)    Hepatitis B Vaccine (1 of 3 - Risk 3-dose series) 04/10/2024 (Originally 5/14/1959)    Potassium monitoring  04/05/2020    Creatinine monitoring  04/05/2020    Flu vaccine  Completed    Pneumococcal 65+ years Vaccine  Completed    HPV vaccine  Aged Out     Patient/Caregiver verbalize understanding of medications.   Yes

## 2019-04-10 NOTE — PROGRESS NOTES
Subjective:      Patient ID: Regina Urbina is a [de-identified] y.o. male. HPI  Pt here today for f/u on chronic medical problems:  HTN, HLD, DM, CKD, CAD, COPD, Anemia, OA,go over labs and/or diagnostic studies, and medication refills. Pt is scheduled for CABG/AVR/Left Carotid Endarterectomy on May 1. His BS's have been up and down and has been snacking on some sweets. Pt today denies any HA, chest pain, or worsening SOB. Pt denies any N/V/D/C or abdominal pain. Pt denies any fever or chills. Pt with arthralgias/back pain on and off, but stable on meds. Pt denies any gouty attacks. Otherwise pt doing well on current tx and no other concerns today. The patient's past medical, surgical, social, and family history as well as his current medications and allergies were reviewed as documented in today's encounter.       Current Outpatient Medications   Medication Sig Dispense Refill    aspirin 81 MG EC tablet Take 81 mg by mouth daily      amLODIPine (NORVASC) 5 MG tablet Take 1 tablet by mouth daily Per Dr. Stevie Maciel 90 tablet 3    allopurinol (ZYLOPRIM) 100 MG tablet TAKE 2 TABLETS DAILY 180 tablet 3    metoprolol succinate (TOPROL XL) 25 MG extended release tablet Take 1 tablet by mouth daily 90 tablet 3    hydrALAZINE (APRESOLINE) 50 MG tablet Take 1 tablet by mouth 2 times daily 180 tablet 3    apixaban (ELIQUIS) 2.5 MG TABS tablet Take 1 tablet by mouth 2 times daily 180 tablet 3    Insulin Degludec (TRESIBA FLEXTOUCH) 100 UNIT/ML SOPN Inject 35U daily 1 pen 0    acetaminophen (TYLENOL) 500 MG tablet Take 500 mg by mouth      atorvastatin (LIPITOR) 20 MG tablet Take 1 tablet by mouth daily 90 tablet 3    furosemide (LASIX) 40 MG tablet Take 1 tablet by mouth 2 times daily 180 tablet 3    Tiotropium Bromide-Olodaterol (STIOLTO RESPIMAT) 2.5-2.5 MCG/ACT AERS Take 2 inhalations daily 1 Inhaler 0    spironolactone (ALDACTONE) 25 MG tablet Take 1 tablet by mouth daily 90 tablet 3    acetylcysteine (MUCOMYST) 10 % nebulizer solution Take 6 mLs by mouth 2 times daily Drink 6 mL (mixed with water) Twice. 12 mL 0     No current facility-administered medications for this visit. Review of Systems   Constitutional: Negative for appetite change, chills, fatigue and fever. HENT: Negative for congestion, ear pain, rhinorrhea and sore throat. Respiratory: Negative for cough, chest tightness and shortness of breath. Cardiovascular: Negative for chest pain, palpitations and leg swelling. Gastrointestinal: Negative for abdominal distention, abdominal pain, constipation, diarrhea, nausea and vomiting. Genitourinary: Negative for difficulty urinating and dysuria. Musculoskeletal: Positive for arthralgias and back pain. Negative for myalgias. Skin: Negative for rash. Neurological: Negative for dizziness, weakness, light-headedness and headaches. Hematological: Negative for adenopathy. Psychiatric/Behavioral: Negative for behavioral problems and sleep disturbance. The patient is not nervous/anxious. Objective:   Physical Exam   Constitutional: He is oriented to person, place, and time. He appears well-developed. No distress. HENT:   Head: Normocephalic and atraumatic. Right Ear: External ear normal.   Left Ear: External ear normal.   Nose: Nose normal.   Mouth/Throat: Oropharynx is clear and moist. No oropharyngeal exudate. Eyes: Pupils are equal, round, and reactive to light. Conjunctivae and EOM are normal.   Neck: Normal range of motion. Cardiovascular: Normal rate, regular rhythm and intact distal pulses. Murmur heard. Systolic murmur is present with a grade of 3/6. Pulmonary/Chest: Effort normal and breath sounds normal. No respiratory distress. He has no wheezes. He exhibits no tenderness. Abdominal: Soft. Bowel sounds are normal. He exhibits no distension and no mass. There is no tenderness. Musculoskeletal: Normal range of motion. He exhibits no edema or tenderness. Lymphadenopathy:     He has no cervical adenopathy. Neurological: He is alert and oriented to person, place, and time. He has normal reflexes. Skin: No rash noted. Psychiatric: He has a normal mood and affect. His behavior is normal.   Vitals reviewed. Labs reviewed with pt today. Assessment:       Diagnosis Orders   1. Essential hypertension     2. Pure hypercholesterolemia     3. Type 2 diabetes mellitus with complication, with long-term current use of insulin ([facility]     4. Chronic kidney disease (CKD), stage IV (severe) ([facility]     5. Coronary artery disease involving native coronary artery of native heart without angina pectoris     6. Chronic obstructive pulmonary disease, unspecified COPD type ([facility]     7. Iron deficiency anemia, unspecified iron deficiency anemia type     8. Chronic gout without tophus, unspecified cause, unspecified site     9. Nonrheumatic aortic valve stenosis     10. Left carotid stenosis     11. PAF (paroxysmal atrial fibrillation) ([facility]           Plan:         Orders Placed This Encounter   Medications    amLODIPine (NORVASC) 5 MG tablet     Sig: Take 1 tablet by mouth daily Per Dr. Lizy Estrada:  90 tablet     Refill:  3    allopurinol (ZYLOPRIM) 100 MG tablet     Sig: TAKE 2 TABLETS DAILY     Dispense:  180 tablet     Refill:  3     Continue with ADA diet, current diabetic meds, and monitoring BS's as instructed and increase the [place] to 40U daily    Cont with daily foot exams and yearly eye exams    No Ace-I secondary to CKD    Will cont with low fat/chol diet and Lipitor as ordered    Will be going for CABG/AVR/Left carotid Endarterectomy on May 1 and will follow up after he is discharged    Rest of systems unchanged, continue current treatments. Medications, labs, diagnostic studies, consultations and follow-up as documented in this encounter.  Rest of systems unchanged, continue current treatments        Jose Rodríguez MD

## 2019-04-16 ENCOUNTER — TELEPHONE (OUTPATIENT)
Dept: FAMILY MEDICINE CLINIC | Age: 79
End: 2019-04-16

## 2019-04-16 NOTE — TELEPHONE ENCOUNTER
Patient would like to update you on his daily sugar readings:     4/11: 88  4/12: 77  4/13: 53  4/14: 124  4/15: 70  4/16: 77

## 2019-04-22 ENCOUNTER — TELEPHONE (OUTPATIENT)
Dept: FAMILY MEDICINE CLINIC | Age: 79
End: 2019-04-22

## 2019-04-22 NOTE — TELEPHONE ENCOUNTER
Patient called in  blood sugars: Patient on 35 units.     04/17/(66) 04/18( 117) 72/08(57) 04/20(68) 04/21(77) 04/22(65)

## 2019-05-01 ENCOUNTER — TELEPHONE (OUTPATIENT)
Dept: FAMILY MEDICINE CLINIC | Age: 79
End: 2019-05-01

## 2019-05-01 NOTE — TELEPHONE ENCOUNTER
Dr. Xavier Chaparro called to give update to Dr. Del Diaz. The patient had his OR today, his carotid done per Dr. Bruna Rehman, and Dr. Xavier Chaparro did an AVR and 2 vessel bypass. He did say he had some bleeding with the CEA and the donor site in his leg, but at the moment he is hemodynamically stable and he is in the ICU.

## 2019-05-23 ENCOUNTER — TELEPHONE (OUTPATIENT)
Dept: FAMILY MEDICINE CLINIC | Age: 79
End: 2019-05-23

## 2019-05-23 NOTE — TELEPHONE ENCOUNTER
Patient called to update us on his condition. He was DC'd from [facility] after his cardiac and carotid surgeries and went to [address]. He is getting HD on T-Th-Sat and the johnnie there told him he should get the series of Hep B shots, it's a series of 4-6 shots. I explained that it is a series of 3 immunizations and that they did just do his titers while he was hospitalized and he is fine. He was a little upset to learn that he can't come to the office to see the doctor until he gets out of rehab, but he will call when he knows he is going to be going home. I told him he can call anytime he has questions!

## 2019-06-06 ENCOUNTER — TELEPHONE (OUTPATIENT)
Dept: FAMILY MEDICINE CLINIC | Age: 79
End: 2019-06-06

## 2019-06-06 NOTE — TELEPHONE ENCOUNTER
Patient called to say he got out of the ECF last night and he is not going back no matter what! He is still getting hemodialysis 3 X's/week. He is going to get the Hep B series with them. [facility] Transitions Initial Follow Up Call    Outreach made within 2 business days of discharge: Yes    Patient: Martha Alvarez Patient : 1940   MRN: [id number]  Reason for Admission: No discharge information exists for this patient. Discharge Date:         Spoke with: Patient    Discharge department/facility: 19    TCM Interactive Patient Contact:  Was patient able to fill all prescriptions: Yes  Was patient instructed to bring all medications to the follow-up visit: Yes  Is patient taking all medications as directed in the discharge summary?  Yes  Does patient understand their discharge instructions: Yes  Does patient have questions or concerns that need addressed prior to 7-14 day follow up office visit: no    Scheduled appointment with PCP within 7-14 days    Follow Up  Future Appointments   Date Time Provider Harmeet Cole   7/10/2019  8:15 AM Marton Pea, MD [facility] Alley   2019 12:30 PM Rachel Camejo MD [facility] None       [place], [place]

## 2019-06-06 NOTE — TELEPHONE ENCOUNTER
Patient was recently readmitted to the hospital for a GI bleed. Will not be able to schedule for a transitional visit because he is still in the ECF. Dialysis did call to see why we told the patient he didn't need the hepatitis vaccine and I explained that he was tested at the hospital when he was in.

## 2019-06-07 ENCOUNTER — TELEPHONE (OUTPATIENT)
Dept: FAMILY MEDICINE CLINIC | Age: 79
End: 2019-06-07

## 2019-06-12 ENCOUNTER — OFFICE VISIT (OUTPATIENT)
Dept: FAMILY MEDICINE CLINIC | Age: 79
End: 2019-06-12
Payer: COMMERCIAL

## 2019-06-12 VITALS
WEIGHT: 237 LBS | SYSTOLIC BLOOD PRESSURE: 124 MMHG | BODY MASS INDEX: 35 KG/M2 | DIASTOLIC BLOOD PRESSURE: 72 MMHG | OXYGEN SATURATION: 97 % | HEART RATE: 70 BPM | RESPIRATION RATE: 16 BRPM | TEMPERATURE: 98.9 F

## 2019-06-12 DIAGNOSIS — Z95.0 STATUS POST PLACEMENT OF CARDIAC PACEMAKER: ICD-10-CM

## 2019-06-12 DIAGNOSIS — I48.91 POSTOPERATIVE ATRIAL FIBRILLATION (HCC): ICD-10-CM

## 2019-06-12 DIAGNOSIS — E11.8 TYPE 2 DIABETES MELLITUS WITH COMPLICATION, WITH LONG-TERM CURRENT USE OF INSULIN (HCC): ICD-10-CM

## 2019-06-12 DIAGNOSIS — Z79.4 TYPE 2 DIABETES MELLITUS WITH COMPLICATION, WITH LONG-TERM CURRENT USE OF INSULIN (HCC): ICD-10-CM

## 2019-06-12 DIAGNOSIS — Z95.2 STATUS POST AORTIC VALVE REPLACEMENT: ICD-10-CM

## 2019-06-12 DIAGNOSIS — L60.0 EMBEDDED TOENAIL: ICD-10-CM

## 2019-06-12 DIAGNOSIS — J44.9 CHRONIC OBSTRUCTIVE PULMONARY DISEASE, UNSPECIFIED COPD TYPE (HCC): ICD-10-CM

## 2019-06-12 DIAGNOSIS — N18.4 CHRONIC KIDNEY DISEASE (CKD), STAGE IV (SEVERE) (HCC): ICD-10-CM

## 2019-06-12 DIAGNOSIS — I97.89 POSTOPERATIVE ATRIAL FIBRILLATION (HCC): ICD-10-CM

## 2019-06-12 DIAGNOSIS — Z98.890 HISTORY OF LEFT-SIDED CAROTID ENDARTERECTOMY: ICD-10-CM

## 2019-06-12 DIAGNOSIS — I50.20 SYSTOLIC CONGESTIVE HEART FAILURE, UNSPECIFIED HF CHRONICITY (HCC): ICD-10-CM

## 2019-06-12 DIAGNOSIS — Z95.1 STATUS POST AORTO-CORONARY ARTERY BYPASS GRAFT: Primary | ICD-10-CM

## 2019-06-12 DIAGNOSIS — K92.2 GASTROINTESTINAL HEMORRHAGE, UNSPECIFIED GASTROINTESTINAL HEMORRHAGE TYPE: ICD-10-CM

## 2019-06-12 PROCEDURE — 99496 TRANSJ CARE MGMT HIGH F2F 7D: CPT | Performed by: FAMILY MEDICINE

## 2019-06-12 PROCEDURE — 1111F DSCHRG MED/CURRENT MED MERGE: CPT | Performed by: FAMILY MEDICINE

## 2019-06-12 RX ORDER — TAMSULOSIN HYDROCHLORIDE 0.4 MG/1
0.4 CAPSULE ORAL DAILY
COMMUNITY
End: 2019-07-10 | Stop reason: ALTCHOICE

## 2019-06-12 RX ORDER — WARFARIN SODIUM 4 MG/1
4 TABLET ORAL
COMMUNITY

## 2019-06-12 RX ORDER — MIDODRINE HYDROCHLORIDE 10 MG/1
10 TABLET ORAL 2 TIMES DAILY PRN
COMMUNITY
End: 2019-07-10 | Stop reason: ALTCHOICE

## 2019-06-12 RX ORDER — FUROSEMIDE 40 MG/1
40 TABLET ORAL DAILY
COMMUNITY
End: 2020-04-03 | Stop reason: SDUPTHER

## 2019-06-12 RX ORDER — POLYETHYLENE GLYCOL 3350 17 G/17G
17 POWDER, FOR SOLUTION ORAL DAILY PRN
COMMUNITY

## 2019-06-12 NOTE — PROGRESS NOTES
Visit Information    Have you changed or started any medications since your last visit including any over-the-counter medicines, vitamins, or herbal medicines? no   Have you stopped taking any of your medications? Is so, why? -  no  Are you having any side effects from any of your medications? - no    Have you seen any other physician or provider since your last visit? yes - Dr. Marcella Bajwa   Have you had any other diagnostic tests since your last visit? yes - while hospitalized   Have you been seen in the emergency room and/or had an admission in a hospital since we last saw you?  yes - X's 2   Have you had your routine dental cleaning in the past 6 months?  no     Do you have an active MyChart account? If no, what is the barrier?   Yes    Patient Care Team:  Nicola Alvarado MD as PCP - General (Family Medicine)  Nicola Alvarado MD as PCP - [facility]  Partha Ball MD as Consulting Physician (Nephrology)  Dio Pool MD as Consulting Physician (Urology)  Nandini Mendoza DPM as Consulting Physician (Podiatry)  Shira Rasmussen MD as Consulting Physician (Cardiology)  Lisa Seals MD as Consulting Physician (Internal Medicine Cardiovascular Disease)  Jay Horn MD as Consulting Physician (Cardiology)  Korey Jackson MD as Consulting Physician (Internal Medicine)  Kelly Lubin MD as Surgeon (Vascular Surgery)  Claudine Niño MD as Surgeon (Internal Medicine Cardiovascular Disease)  Emma Ram as Consulting Physician (Thoracic Surgery)    Medical History Review  Past Medical, Family, and Social History reviewed and does not contribute to the patient presenting condition    Health Maintenance   Topic Date Due    Shingles Vaccine (1 of 2) 10/10/2019 (Originally 5/14/1988)    Diabetic foot exam  04/10/2020 (Originally 8/30/2014)    DTaP/Tdap/Td vaccine (1 - Tdap) 11/18/2021 (Originally 5/14/1959)    Hepatitis B Vaccine (1 of 3 - Risk 3-dose series) 04/10/2024 (Originally 5/14/1959)    Potassium monitoring  04/05/2020    Creatinine monitoring  04/05/2020    Flu vaccine  Completed    Pneumococcal 65+ years Vaccine  Completed    HPV vaccine  Aged Out     Patient/Caregiver verbalize understanding of medications.   Yes

## 2019-06-12 NOTE — PROGRESS NOTES
Post-Discharge Transitional Care Management Services or Hospital Follow Up      Regina Urbina   YOB: 1940    Date of Office Visit:  6/12/2019  Date of Hospital Admission:    Date of Hospital Discharge:    Risk of hospital readmission (high >=14%. Medium >=10%) :No data recorded    Care management risk score Rising risk (score 2-5) and Complex Care (Scores >=6): 5     Non face to face  following discharge, date last encounter closed (first attempt may have been earlier): 6/6/2019  4:14 PM    Call initiated 2 business days of discharge: Yes    Patient Active Problem List   Diagnosis    Sleep apnea    Iron deficiency anemia    Chronic kidney disease (CKD), stage IV (severe) ([address]    Essential hypertension    Chronic obstructive pulmonary disease ([address]    Degeneration of intervertebral disc of lumbosacral region    Malignant neoplasm of urinary bladder ([address]    Pure hypercholesterolemia    Type 2 diabetes mellitus with complication, with long-term current use of insulin (HCC)    Chronic gout without tophus    Status post placement of cardiac pacemaker    Postoperative atrial fibrillation (HCC)    Aortic valve stenosis    Diastolic dysfunction with chronic heart failure ([address]    Nonrheumatic aortic valve insufficiency    Systolic congestive heart failure ([address]    Coronary artery disease involving native coronary artery of native heart without angina pectoris    Left carotid stenosis    Status post aorto-coronary artery bypass graft    Status post aortic valve replacement    History of left-sided carotid endarterectomy    Gastrointestinal hemorrhage       Allergies   Allergen Reactions    Pcn [Penicillins]      burning  Hot/redness at injection site.        Medications listed as ordered at the time of discharge from hospital   [address] [place] [address] Medication Instructions RONAN:    Printed on:06/12/19 1022   Medication Information                      acetaminophen (TYLENOL) 500 MG tablet  Take 500 mg by mouth             allopurinol (ZYLOPRIM) 100 MG tablet  TAKE 2 TABLETS DAILY             aspirin 81 MG EC tablet  Take 81 mg by mouth daily             atorvastatin (LIPITOR) 20 MG tablet  Take 1 tablet by mouth daily             furosemide (LASIX) 40 MG tablet  Take 40 mg by mouth daily             Insulin Degludec (TRESIBA FLEXTOUCH) 100 UNIT/ML SOPN  Inject 35U daily             metoprolol succinate (TOPROL XL) 25 MG extended release tablet  Take 1 tablet by mouth daily             midodrine (PROAMATINE) 10 MG tablet  Take 10 mg by mouth 2 times daily as needed (During dialysis)             polyethylene glycol (GLYCOLAX) powder  Take 17 g by mouth daily as needed             tamsulosin (FLOMAX) 0.4 MG capsule  Take 0.4 mg by mouth daily             tiotropium (SPIRIVA RESPIMAT) 2.5 MCG/ACT AERS inhaler  2 puffs dsily             warfarin (COUMADIN) 4 MG tablet  Take 4 mg by mouth Per Jobst                   Medications marked \"taking\" at this time  Outpatient Medications Marked as Taking for the 6/12/19 encounter (Office Visit) with Carmen Emmanuel MD   Medication Sig Dispense Refill    furosemide (LASIX) 40 MG tablet Take 40 mg by mouth daily      midodrine (PROAMATINE) 10 MG tablet Take 10 mg by mouth 2 times daily as needed (During dialysis)      polyethylene glycol (GLYCOLAX) powder Take 17 g by mouth daily as needed      warfarin (COUMADIN) 4 MG tablet Take 4 mg by mouth Per Jobst      tamsulosin (FLOMAX) 0.4 MG capsule Take 0.4 mg by mouth daily      tiotropium (SPIRIVA RESPIMAT) 2.5 MCG/ACT AERS inhaler 2 puffs dsily 1 Inhaler 6    aspirin 81 MG EC tablet Take 81 mg by mouth daily      allopurinol (ZYLOPRIM) 100 MG tablet TAKE 2 TABLETS DAILY 180 tablet 3    metoprolol succinate (TOPROL XL) 25 MG extended release tablet Take 1 tablet by mouth daily (Patient taking differently: Take 12.5 mg by mouth daily ) 90 tablet 3    Insulin Degludec (TRESIBA FLEXTOUCH) 100 UNIT/ML SOPN Inject 35U daily 1 pen 0    acetaminophen (TYLENOL) 500 MG tablet Take 500 mg by mouth      atorvastatin (LIPITOR) 20 MG tablet Take 1 tablet by mouth daily 90 tablet 3        Medications patient taking as of now reconciled against medications ordered at time of hospital discharge: Yes    Chief Complaint   Patient presents with    Follow-Up from Hospital     BS 40-60 in am       History of Present illness - Follow up of Hospital diagnosis(es): pt is here for hospital follow up s/p CABG x 2, Aortic valve repair, and left CEA. Pt's kidney function did worsen post-op and was started on dialysis of which he is currently getting 3x per week. He did go in to atrial fibrillation post-op. Pt was started on Coumadin post-op and his levels did elevated and had a GI bleed and went back in the hospital. GI saw the pt and did an EGD and saw some small erosions and started him on Protonix. Pt did go to Rehab for a week, but is now home with Home Care. He is going to be starting Cardiac Rehab and CHF Clinic once his home care is done. He overall feels pretty good with what he had done. He is not driving, but is anxious about getting back driving. Pt today denies any HA, chest pain, or SOB. Pt denies any N/V/D/C or abdominal pain. Pt denies any fever or chills. His BS's have been running 60-80's. He does state his appetite is decreased from prior to surgery and he is very limited with what he can eat with a cardiac diet, renal diet, and watching foods that affect his Coumadin levels. Inpatient course: Discharge summary reviewed- see chart. Interval history/Current status: See the written copy of this report in the patient's paper medical record. These results did not interface directly into the electronic medical record and are summarized here. HPI    A comprehensive review of systems was negative except for what was noted in the HPI.     Vitals:    06/12/19 1019   BP: 124/72   Pulse: 70   Resp: 16   Temp:

## 2019-06-17 ENCOUNTER — TELEPHONE (OUTPATIENT)
Dept: FAMILY MEDICINE CLINIC | Age: 79
End: 2019-06-17

## 2019-06-17 NOTE — TELEPHONE ENCOUNTER
Patient called to give us what his blood sugars have been running   13th-66 14th-81  15th-192- states he had a big bowl of spaghetti with lots of sauce  16th-81 17th-104    I told him I would call him if you wanted to change anything.

## 2019-06-24 ENCOUNTER — TELEPHONE (OUTPATIENT)
Dept: FAMILY MEDICINE CLINIC | Age: 79
End: 2019-06-24

## 2019-06-28 ENCOUNTER — TELEPHONE (OUTPATIENT)
Dept: FAMILY MEDICINE CLINIC | Age: 79
End: 2019-06-28

## 2019-06-28 NOTE — TELEPHONE ENCOUNTER
Ambar Howard from [facility] care called and wanted to let you know that they discharged him from home care and is doing well she also wanted to let us know she heard a small noise in his left lower lung but nothing concerning just wanted to let us know and he is no longer having SOB.   If we have any more questions he number is [phone]

## 2019-07-01 ENCOUNTER — TELEPHONE (OUTPATIENT)
Dept: FAMILY MEDICINE CLINIC | Age: 79
End: 2019-07-01

## 2019-07-03 ENCOUNTER — TELEPHONE (OUTPATIENT)
Dept: FAMILY MEDICINE CLINIC | Age: 79
End: 2019-07-03

## 2019-07-10 ENCOUNTER — OFFICE VISIT (OUTPATIENT)
Dept: FAMILY MEDICINE CLINIC | Age: 79
End: 2019-07-10
Payer: COMMERCIAL

## 2019-07-10 VITALS
RESPIRATION RATE: 16 BRPM | WEIGHT: 228 LBS | BODY MASS INDEX: 33.67 KG/M2 | HEART RATE: 70 BPM | SYSTOLIC BLOOD PRESSURE: 112 MMHG | DIASTOLIC BLOOD PRESSURE: 66 MMHG | OXYGEN SATURATION: 94 % | TEMPERATURE: 97.6 F

## 2019-07-10 DIAGNOSIS — I50.22 CHRONIC SYSTOLIC CONGESTIVE HEART FAILURE (HCC): ICD-10-CM

## 2019-07-10 DIAGNOSIS — M54.50 MIDLINE LOW BACK PAIN WITHOUT SCIATICA, UNSPECIFIED CHRONICITY: ICD-10-CM

## 2019-07-10 DIAGNOSIS — Z79.4 TYPE 2 DIABETES MELLITUS WITH COMPLICATION, WITH LONG-TERM CURRENT USE OF INSULIN (HCC): ICD-10-CM

## 2019-07-10 DIAGNOSIS — I10 ESSENTIAL HYPERTENSION: ICD-10-CM

## 2019-07-10 DIAGNOSIS — E78.00 PURE HYPERCHOLESTEROLEMIA: ICD-10-CM

## 2019-07-10 DIAGNOSIS — I25.10 CORONARY ARTERY DISEASE INVOLVING NATIVE CORONARY ARTERY OF NATIVE HEART WITHOUT ANGINA PECTORIS: Primary | ICD-10-CM

## 2019-07-10 DIAGNOSIS — M1A.9XX0 CHRONIC GOUT WITHOUT TOPHUS, UNSPECIFIED CAUSE, UNSPECIFIED SITE: ICD-10-CM

## 2019-07-10 DIAGNOSIS — Z98.890 HISTORY OF LEFT-SIDED CAROTID ENDARTERECTOMY: ICD-10-CM

## 2019-07-10 DIAGNOSIS — J44.9 CHRONIC OBSTRUCTIVE PULMONARY DISEASE, UNSPECIFIED COPD TYPE (HCC): ICD-10-CM

## 2019-07-10 DIAGNOSIS — N18.4 CHRONIC KIDNEY DISEASE (CKD), STAGE IV (SEVERE) (HCC): ICD-10-CM

## 2019-07-10 DIAGNOSIS — D50.9 IRON DEFICIENCY ANEMIA, UNSPECIFIED IRON DEFICIENCY ANEMIA TYPE: ICD-10-CM

## 2019-07-10 DIAGNOSIS — Z95.2 STATUS POST AORTIC VALVE REPLACEMENT: ICD-10-CM

## 2019-07-10 DIAGNOSIS — E11.8 TYPE 2 DIABETES MELLITUS WITH COMPLICATION, WITH LONG-TERM CURRENT USE OF INSULIN (HCC): ICD-10-CM

## 2019-07-10 PROCEDURE — 99214 OFFICE O/P EST MOD 30 MIN: CPT | Performed by: FAMILY MEDICINE

## 2019-07-10 RX ORDER — PANTOPRAZOLE SODIUM 40 MG/1
40 TABLET, DELAYED RELEASE ORAL DAILY
COMMUNITY
Start: 2019-06-27

## 2019-07-10 ASSESSMENT — ENCOUNTER SYMPTOMS
CONSTIPATION: 0
VOMITING: 0
NAUSEA: 0
DIARRHEA: 0
ABDOMINAL PAIN: 0
COUGH: 1
RHINORRHEA: 0
SHORTNESS OF BREATH: 0
BACK PAIN: 1
SORE THROAT: 0
ABDOMINAL DISTENTION: 0
CHEST TIGHTNESS: 0

## 2019-07-10 NOTE — PROGRESS NOTES
HYPERTENSION visit     BP Readings from Last 3 Encounters:   06/12/19 124/72   04/18/19 (!) 118/50   04/10/19 (!) 104/58       LDL Calculated (mg/dL)   Date Value   01/03/2019 56     HDL (mg/dL)   Date Value   01/03/2019 35     BUN (mg/dL)   Date Value   07/08/2019 50     CREATININE (no units)   Date Value   07/08/2019 2.11     Glucose (mg/dL)   Date Value   07/08/2019 70              Have you changed or started any medications since your last visit including any over-the-counter medicines, vitamins, or herbal medicines? no   Have you stopped taking any of your medications? Is so, why? -  no  Are you having any side effects from any of your medications? - no  How often do you miss doses of your medication? rare      Have you seen any other physician or provider since your last visit? yes - Dr. Roge Frank   Have you had any other diagnostic tests since your last visit? yes - labs   Have you been seen in the emergency room and/or had an admission in a hospital since we last saw you?  no   Have you had your routine dental cleaning in the past 6 months?  no     Do you have an active MyChart account? If no, what is the barrier?   Yes    Patient Care Team:  Tk Crowley MD as PCP - General (Family Medicine)  Tk Crowley MD as PCP - [facility]  Apolinar Sloan MD as Consulting Physician (Nephrology)  Yuri Reynolds MD as Consulting Physician (Urology)  Mitchell Pena DPM as Consulting Physician (Podiatry)  Vania Burrell MD as Consulting Physician (Cardiology)  Paul Fernández MD as Consulting Physician (Internal Medicine Cardiovascular Disease)  Lilliam Russell MD as Consulting Physician (Cardiology)  Margie Landeros MD as Consulting Physician (Internal Medicine)  Peggy Smith MD as Surgeon (Vascular Surgery)  Darlene Khalil MD as Surgeon (Internal Medicine Cardiovascular Disease)  Jose Paiz as Consulting Physician (Thoracic Surgery)    Medical History Review  Past

## 2019-07-10 NOTE — PROGRESS NOTES
swelling. Lymphadenopathy:     He has no cervical adenopathy. Neurological: He is alert and oriented to person, place, and time. He has normal reflexes. Skin: No rash noted. Psychiatric: He has a normal mood and affect. His behavior is normal.   Vitals reviewed. Labs reviewed with pt today. Assessment:       Diagnosis Orders   1. Coronary artery disease involving native coronary artery of native heart without angina pectoris  Comprehensive Metabolic Panel    Uric Acid    s/p CABG x 2   2. Status post aortic valve replacement     3. History of left-sided carotid endarterectomy     4. Essential hypertension  CBC    Comprehensive Metabolic Panel   5. Type 2 diabetes mellitus with complication, with long-term current use of insulin ([facility])  Comprehensive Metabolic Panel    Hemoglobin A1C   6. Chronic kidney disease (CKD), stage IV (severe) ([facility])  Comprehensive Metabolic Panel   7. Chronic obstructive pulmonary disease, unspecified COPD type ([facility]  Comprehensive Metabolic Panel   8. Chronic systolic congestive heart failure ([facility])  Comprehensive Metabolic Panel   9. Iron deficiency anemia, unspecified iron deficiency anemia type  CBC   10. Pure hypercholesterolemia  Lipid Panel   11. Chronic gout without tophus, unspecified cause, unspecified site  Uric Acid   12.  Midline low back pain without sciatica, unspecified chronicity           Plan:      Orders Placed This Encounter   Procedures    CBC     Standing Status:   Future     Standing Expiration Date:   7/9/2020    Comprehensive Metabolic Panel     Standing Status:   Future     Standing Expiration Date:   7/9/2020    Hemoglobin A1C     Standing Status:   Future     Standing Expiration Date:   7/9/2020    Lipid Panel     Standing Status:   Future     Standing Expiration Date:   7/9/2020     Order Specific Question:   Is Patient Fasting?/# of Hours     Answer:   8-10 Hours    Uric Acid     Standing Status:   Future     Standing Expiration Date:   7/9/2020 Will cont with current treatment as pt is currently stable on current treatment    Continue with ADA diet, current diabetic meds, and monitoring BS's as instructed and cont with the [place] 20U daily    No Ace-I secondary to CKD    Cont with daily foot exams and yearly eye exams    Will cont with low fat/chol diet and Lipitor as ordered    Will cont with Tylenol for his back and I did give him stretches to do and will call if no improvement    Will cont to follow with Dr. Brian Barlow as instructed and getting his catheter removed today and hopefully will be able to stay off dialysis    Will cont to follow with Cardiology and Cardiac Rehab as instructed    Rest of systems unchanged, continue current treatments. Medications, labs, diagnostic studies, consultations and follow-up as documented in this encounter.  Rest of systems unchanged, continue current treatments        Catalina Martin MD

## 2019-07-11 ENCOUNTER — OFFICE VISIT (OUTPATIENT)
Dept: PODIATRY | Age: 79
End: 2019-07-11
Payer: COMMERCIAL

## 2019-07-11 VITALS — WEIGHT: 228 LBS | BODY MASS INDEX: 33.67 KG/M2

## 2019-07-11 DIAGNOSIS — E11.51 TYPE 2 DIABETES MELLITUS WITH PERIPHERAL VASCULAR DISEASE (HCC): ICD-10-CM

## 2019-07-11 DIAGNOSIS — M79.671 PAIN OF RIGHT FOOT: ICD-10-CM

## 2019-07-11 DIAGNOSIS — M79.672 PAIN IN LEFT FOOT: ICD-10-CM

## 2019-07-11 DIAGNOSIS — B35.1 ONYCHOMYCOSIS OF TOENAIL: Primary | ICD-10-CM

## 2019-07-11 DIAGNOSIS — B35.3 TINEA PEDIS OF BOTH FEET: ICD-10-CM

## 2019-07-11 DIAGNOSIS — M79.675 PAIN OF TOES OF BOTH FEET: ICD-10-CM

## 2019-07-11 DIAGNOSIS — M79.674 PAIN OF TOES OF BOTH FEET: ICD-10-CM

## 2019-07-11 DIAGNOSIS — E11.42 DIABETIC POLYNEUROPATHY ASSOCIATED WITH TYPE 2 DIABETES MELLITUS (HCC): ICD-10-CM

## 2019-07-11 PROCEDURE — 11721 DEBRIDE NAIL 6 OR MORE: CPT | Performed by: PODIATRIST

## 2019-07-11 PROCEDURE — 99203 OFFICE O/P NEW LOW 30 MIN: CPT | Performed by: PODIATRIST

## 2019-07-11 ASSESSMENT — ENCOUNTER SYMPTOMS
NAUSEA: 0
COLOR CHANGE: 0
DIARRHEA: 0
SHORTNESS OF BREATH: 0
BACK PAIN: 0

## 2019-07-17 ENCOUNTER — TELEPHONE (OUTPATIENT)
Dept: BEHAVIORAL/MENTAL HEALTH CLINIC | Age: 79
End: 2019-07-17

## 2019-07-17 RX ORDER — TIZANIDINE 4 MG/1
4 TABLET ORAL 3 TIMES DAILY
Qty: 60 TABLET | Refills: 0 | Status: SHIPPED | OUTPATIENT
Start: 2019-07-17 | End: 2020-01-28

## 2019-08-05 ENCOUNTER — TELEPHONE (OUTPATIENT)
Dept: FAMILY MEDICINE CLINIC | Age: 79
End: 2019-08-05

## 2019-08-05 DIAGNOSIS — M54.50 MIDLINE LOW BACK PAIN WITHOUT SCIATICA, UNSPECIFIED CHRONICITY: Primary | ICD-10-CM

## 2019-08-07 DIAGNOSIS — M54.50 MIDLINE LOW BACK PAIN WITHOUT SCIATICA, UNSPECIFIED CHRONICITY: Primary | ICD-10-CM

## 2019-09-05 LAB
ALBUMIN SERPL-MCNC: 3.7 G/DL
ALP BLD-CCNC: 54 U/L
ALT SERPL-CCNC: 12 U/L
ANION GAP SERPL CALCULATED.3IONS-SCNC: 9 MMOL/L
AST SERPL-CCNC: 18 U/L
AVERAGE GLUCOSE: 160
BASOPHILS ABSOLUTE: ABNORMAL /ΜL
BASOPHILS RELATIVE PERCENT: ABNORMAL %
BILIRUB SERPL-MCNC: 0.6 MG/DL (ref 0.1–1.4)
BUN BLDV-MCNC: 50 MG/DL
CALCIUM SERPL-MCNC: 9.4 MG/DL
CHLORIDE BLD-SCNC: 105 MMOL/L
CHOLESTEROL, TOTAL: 113 MG/DL
CHOLESTEROL/HDL RATIO: 3.5
CO2: 30 MMOL/L
CREAT SERPL-MCNC: 1.84 MG/DL
EOSINOPHILS ABSOLUTE: ABNORMAL /ΜL
EOSINOPHILS RELATIVE PERCENT: ABNORMAL %
GFR CALCULATED: 35
GLUCOSE BLD-MCNC: 147 MG/DL
HBA1C MFR BLD: 7.2 %
HCT VFR BLD CALC: 35.7 % (ref 41–53)
HDLC SERPL-MCNC: 32 MG/DL (ref 35–70)
HEMOGLOBIN: 11.7 G/DL (ref 13.5–17.5)
LDL CHOLESTEROL CALCULATED: 64 MG/DL (ref 0–160)
LYMPHOCYTES ABSOLUTE: ABNORMAL /ΜL
LYMPHOCYTES RELATIVE PERCENT: ABNORMAL %
MCH RBC QN AUTO: ABNORMAL PG
MCHC RBC AUTO-ENTMCNC: ABNORMAL G/DL
MCV RBC AUTO: ABNORMAL FL
MONOCYTES ABSOLUTE: ABNORMAL /ΜL
MONOCYTES RELATIVE PERCENT: ABNORMAL %
NEUTROPHILS ABSOLUTE: ABNORMAL /ΜL
NEUTROPHILS RELATIVE PERCENT: ABNORMAL %
PLATELET # BLD: 150 K/ΜL
PMV BLD AUTO: ABNORMAL FL
POTASSIUM SERPL-SCNC: 4 MMOL/L
RBC # BLD: ABNORMAL 10^6/ΜL
SODIUM BLD-SCNC: 144 MMOL/L
TOTAL PROTEIN: 7.2
TRIGL SERPL-MCNC: 86 MG/DL
URIC ACID: 7.9
VLDLC SERPL CALC-MCNC: 17 MG/DL
WBC # BLD: 7.5 10^3/ML

## 2019-09-06 DIAGNOSIS — E78.00 PURE HYPERCHOLESTEROLEMIA: ICD-10-CM

## 2019-09-06 DIAGNOSIS — Z79.4 TYPE 2 DIABETES MELLITUS WITH COMPLICATION, WITH LONG-TERM CURRENT USE OF INSULIN (HCC): ICD-10-CM

## 2019-09-06 DIAGNOSIS — I25.10 CORONARY ARTERY DISEASE INVOLVING NATIVE CORONARY ARTERY OF NATIVE HEART WITHOUT ANGINA PECTORIS: ICD-10-CM

## 2019-09-06 DIAGNOSIS — I10 ESSENTIAL HYPERTENSION: ICD-10-CM

## 2019-09-06 DIAGNOSIS — M1A.9XX0 CHRONIC GOUT WITHOUT TOPHUS, UNSPECIFIED CAUSE, UNSPECIFIED SITE: ICD-10-CM

## 2019-09-06 DIAGNOSIS — D50.9 IRON DEFICIENCY ANEMIA, UNSPECIFIED IRON DEFICIENCY ANEMIA TYPE: ICD-10-CM

## 2019-09-06 DIAGNOSIS — N18.4 CHRONIC KIDNEY DISEASE (CKD), STAGE IV (SEVERE) (HCC): ICD-10-CM

## 2019-09-06 DIAGNOSIS — E11.8 TYPE 2 DIABETES MELLITUS WITH COMPLICATION, WITH LONG-TERM CURRENT USE OF INSULIN (HCC): ICD-10-CM

## 2019-09-06 DIAGNOSIS — J44.9 CHRONIC OBSTRUCTIVE PULMONARY DISEASE, UNSPECIFIED COPD TYPE (HCC): ICD-10-CM

## 2019-09-06 DIAGNOSIS — I50.22 CHRONIC SYSTOLIC CONGESTIVE HEART FAILURE (HCC): ICD-10-CM

## 2019-09-12 ENCOUNTER — OFFICE VISIT (OUTPATIENT)
Dept: FAMILY MEDICINE CLINIC | Age: 79
End: 2019-09-12
Payer: COMMERCIAL

## 2019-09-12 VITALS
SYSTOLIC BLOOD PRESSURE: 130 MMHG | HEART RATE: 68 BPM | HEIGHT: 70 IN | BODY MASS INDEX: 33.24 KG/M2 | OXYGEN SATURATION: 98 % | WEIGHT: 232.2 LBS | DIASTOLIC BLOOD PRESSURE: 68 MMHG

## 2019-09-12 DIAGNOSIS — D50.9 IRON DEFICIENCY ANEMIA, UNSPECIFIED IRON DEFICIENCY ANEMIA TYPE: ICD-10-CM

## 2019-09-12 DIAGNOSIS — I25.10 CORONARY ARTERY DISEASE INVOLVING NATIVE CORONARY ARTERY OF NATIVE HEART WITHOUT ANGINA PECTORIS: ICD-10-CM

## 2019-09-12 DIAGNOSIS — J44.9 CHRONIC OBSTRUCTIVE PULMONARY DISEASE, UNSPECIFIED COPD TYPE (HCC): ICD-10-CM

## 2019-09-12 DIAGNOSIS — Z79.4 TYPE 2 DIABETES MELLITUS WITH COMPLICATION, WITH LONG-TERM CURRENT USE OF INSULIN (HCC): ICD-10-CM

## 2019-09-12 DIAGNOSIS — M1A.9XX0 CHRONIC GOUT WITHOUT TOPHUS, UNSPECIFIED CAUSE, UNSPECIFIED SITE: ICD-10-CM

## 2019-09-12 DIAGNOSIS — I50.22 CHRONIC SYSTOLIC CONGESTIVE HEART FAILURE (HCC): ICD-10-CM

## 2019-09-12 DIAGNOSIS — I10 ESSENTIAL HYPERTENSION: Primary | ICD-10-CM

## 2019-09-12 DIAGNOSIS — Z95.0 STATUS POST PLACEMENT OF CARDIAC PACEMAKER: ICD-10-CM

## 2019-09-12 DIAGNOSIS — N18.4 CHRONIC KIDNEY DISEASE (CKD), STAGE IV (SEVERE) (HCC): ICD-10-CM

## 2019-09-12 DIAGNOSIS — E78.00 PURE HYPERCHOLESTEROLEMIA: ICD-10-CM

## 2019-09-12 DIAGNOSIS — Z95.2 STATUS POST AORTIC VALVE REPLACEMENT: ICD-10-CM

## 2019-09-12 DIAGNOSIS — E11.8 TYPE 2 DIABETES MELLITUS WITH COMPLICATION, WITH LONG-TERM CURRENT USE OF INSULIN (HCC): ICD-10-CM

## 2019-09-12 DIAGNOSIS — I50.32 DIASTOLIC DYSFUNCTION WITH CHRONIC HEART FAILURE (HCC): ICD-10-CM

## 2019-09-12 DIAGNOSIS — I65.22 LEFT CAROTID STENOSIS: ICD-10-CM

## 2019-09-12 DIAGNOSIS — T14.8XXA ABRASION: ICD-10-CM

## 2019-09-12 DIAGNOSIS — G47.33 OBSTRUCTIVE SLEEP APNEA SYNDROME: ICD-10-CM

## 2019-09-12 PROCEDURE — 99214 OFFICE O/P EST MOD 30 MIN: CPT | Performed by: FAMILY MEDICINE

## 2019-09-12 ASSESSMENT — ENCOUNTER SYMPTOMS
RHINORRHEA: 0
DIARRHEA: 0
CHEST TIGHTNESS: 0
ABDOMINAL DISTENTION: 0
CONSTIPATION: 0
BACK PAIN: 1
VOMITING: 0
SORE THROAT: 0
NAUSEA: 0
SHORTNESS OF BREATH: 1
ABDOMINAL PAIN: 0
COUGH: 0

## 2019-09-17 DIAGNOSIS — M54.50 MIDLINE LOW BACK PAIN WITHOUT SCIATICA, UNSPECIFIED CHRONICITY: ICD-10-CM

## 2019-09-23 ENCOUNTER — TELEPHONE (OUTPATIENT)
Dept: FAMILY MEDICINE CLINIC | Age: 79
End: 2019-09-23

## 2019-09-23 NOTE — TELEPHONE ENCOUNTER
Patient called to inform us there having surgery on 10/02/ 19 at 1;oopm having a stent put in [facility]. Patient called wants to him to take blood pressure and heart mediation needs to known  they are. Also they states they wanted to known if want to change is insulin dosage in the morning but Patient say they only take it in the evening.

## 2019-10-10 ENCOUNTER — OFFICE VISIT (OUTPATIENT)
Dept: PODIATRY | Age: 79
End: 2019-10-10
Payer: COMMERCIAL

## 2019-10-10 VITALS — WEIGHT: 224 LBS | BODY MASS INDEX: 32.07 KG/M2 | HEIGHT: 70 IN

## 2019-10-10 DIAGNOSIS — M79.674 PAIN OF TOES OF BOTH FEET: ICD-10-CM

## 2019-10-10 DIAGNOSIS — E11.42 DIABETIC POLYNEUROPATHY ASSOCIATED WITH TYPE 2 DIABETES MELLITUS (HCC): ICD-10-CM

## 2019-10-10 DIAGNOSIS — M79.675 PAIN OF TOES OF BOTH FEET: ICD-10-CM

## 2019-10-10 DIAGNOSIS — B35.1 ONYCHOMYCOSIS OF TOENAIL: Primary | ICD-10-CM

## 2019-10-10 DIAGNOSIS — E11.51 TYPE 2 DIABETES MELLITUS WITH PERIPHERAL VASCULAR DISEASE (HCC): ICD-10-CM

## 2019-10-10 PROCEDURE — 99999 PR OFFICE/OUTPT VISIT,PROCEDURE ONLY: CPT | Performed by: PODIATRIST

## 2019-10-10 PROCEDURE — 11721 DEBRIDE NAIL 6 OR MORE: CPT | Performed by: PODIATRIST

## 2019-10-10 ASSESSMENT — ENCOUNTER SYMPTOMS
NAUSEA: 0
COLOR CHANGE: 0
SHORTNESS OF BREATH: 0
DIARRHEA: 0
BACK PAIN: 0

## 2019-10-21 ENCOUNTER — OFFICE VISIT (OUTPATIENT)
Dept: FAMILY MEDICINE CLINIC | Age: 79
End: 2019-10-21
Payer: COMMERCIAL

## 2019-10-21 VITALS
WEIGHT: 227 LBS | HEART RATE: 70 BPM | OXYGEN SATURATION: 97 % | TEMPERATURE: 96.3 F | SYSTOLIC BLOOD PRESSURE: 138 MMHG | BODY MASS INDEX: 32.57 KG/M2 | DIASTOLIC BLOOD PRESSURE: 76 MMHG

## 2019-10-21 DIAGNOSIS — I65.22 LEFT CAROTID STENOSIS: Primary | ICD-10-CM

## 2019-10-21 DIAGNOSIS — I10 ESSENTIAL HYPERTENSION: ICD-10-CM

## 2019-10-21 DIAGNOSIS — I50.22 CHRONIC SYSTOLIC CONGESTIVE HEART FAILURE (HCC): ICD-10-CM

## 2019-10-21 DIAGNOSIS — J44.9 CHRONIC OBSTRUCTIVE PULMONARY DISEASE, UNSPECIFIED COPD TYPE (HCC): ICD-10-CM

## 2019-10-21 DIAGNOSIS — I97.89 POSTOPERATIVE ATRIAL FIBRILLATION (HCC): ICD-10-CM

## 2019-10-21 DIAGNOSIS — I25.10 CORONARY ARTERY DISEASE INVOLVING NATIVE CORONARY ARTERY OF NATIVE HEART WITHOUT ANGINA PECTORIS: ICD-10-CM

## 2019-10-21 DIAGNOSIS — E11.8 TYPE 2 DIABETES MELLITUS WITH COMPLICATION, WITH LONG-TERM CURRENT USE OF INSULIN (HCC): ICD-10-CM

## 2019-10-21 DIAGNOSIS — Z23 FLU VACCINE NEED: ICD-10-CM

## 2019-10-21 DIAGNOSIS — N18.4 CHRONIC KIDNEY DISEASE (CKD), STAGE IV (SEVERE) (HCC): ICD-10-CM

## 2019-10-21 DIAGNOSIS — Z79.4 TYPE 2 DIABETES MELLITUS WITH COMPLICATION, WITH LONG-TERM CURRENT USE OF INSULIN (HCC): ICD-10-CM

## 2019-10-21 DIAGNOSIS — I48.91 POSTOPERATIVE ATRIAL FIBRILLATION (HCC): ICD-10-CM

## 2019-10-21 PROCEDURE — 90653 IIV ADJUVANT VACCINE IM: CPT | Performed by: FAMILY MEDICINE

## 2019-10-21 PROCEDURE — G0008 ADMIN INFLUENZA VIRUS VAC: HCPCS | Performed by: FAMILY MEDICINE

## 2019-10-21 PROCEDURE — 1111F DSCHRG MED/CURRENT MED MERGE: CPT | Performed by: FAMILY MEDICINE

## 2019-10-21 PROCEDURE — 99214 OFFICE O/P EST MOD 30 MIN: CPT | Performed by: FAMILY MEDICINE

## 2019-11-05 RX ORDER — ATORVASTATIN CALCIUM 20 MG/1
TABLET, FILM COATED ORAL
Qty: 90 TABLET | Refills: 3 | Status: SHIPPED | OUTPATIENT
Start: 2019-11-05 | End: 2020-11-16

## 2019-12-10 DIAGNOSIS — I10 ESSENTIAL HYPERTENSION: Primary | ICD-10-CM

## 2019-12-10 DIAGNOSIS — M1A.9XX0 CHRONIC GOUT WITHOUT TOPHUS, UNSPECIFIED CAUSE, UNSPECIFIED SITE: ICD-10-CM

## 2019-12-10 DIAGNOSIS — Z79.4 TYPE 2 DIABETES MELLITUS WITH COMPLICATION, WITH LONG-TERM CURRENT USE OF INSULIN (HCC): ICD-10-CM

## 2019-12-10 DIAGNOSIS — E78.00 PURE HYPERCHOLESTEROLEMIA: ICD-10-CM

## 2019-12-10 DIAGNOSIS — N18.4 CHRONIC KIDNEY DISEASE (CKD), STAGE IV (SEVERE) (HCC): ICD-10-CM

## 2019-12-10 DIAGNOSIS — D50.9 IRON DEFICIENCY ANEMIA, UNSPECIFIED IRON DEFICIENCY ANEMIA TYPE: ICD-10-CM

## 2019-12-10 DIAGNOSIS — I10 ESSENTIAL HYPERTENSION: ICD-10-CM

## 2019-12-10 DIAGNOSIS — E11.8 TYPE 2 DIABETES MELLITUS WITH COMPLICATION, WITH LONG-TERM CURRENT USE OF INSULIN (HCC): ICD-10-CM

## 2019-12-10 LAB
AVERAGE GLUCOSE: 154
BASOPHILS ABSOLUTE: ABNORMAL
BASOPHILS RELATIVE PERCENT: ABNORMAL
BUN BLDV-MCNC: 43 MG/DL
CALCIUM SERPL-MCNC: 9.7 MG/DL
CHLORIDE BLD-SCNC: 103 MMOL/L
CO2: 32 MMOL/L
CREAT SERPL-MCNC: 2.07 MG/DL
EOSINOPHILS ABSOLUTE: ABNORMAL
EOSINOPHILS RELATIVE PERCENT: ABNORMAL
GFR CALCULATED: 31
GLUCOSE BLD-MCNC: 111 MG/DL
HBA1C MFR BLD: 7 %
HCT VFR BLD CALC: 38 % (ref 41–53)
HEMOGLOBIN: 12.2 G/DL (ref 13.5–17.5)
LYMPHOCYTES ABSOLUTE: ABNORMAL
LYMPHOCYTES RELATIVE PERCENT: ABNORMAL
MCH RBC QN AUTO: ABNORMAL PG
MCHC RBC AUTO-ENTMCNC: ABNORMAL G/DL
MCV RBC AUTO: ABNORMAL FL
MONOCYTES ABSOLUTE: ABNORMAL
MONOCYTES RELATIVE PERCENT: ABNORMAL
NEUTROPHILS ABSOLUTE: ABNORMAL
NEUTROPHILS RELATIVE PERCENT: ABNORMAL
PLATELET # BLD: 162 K/ΜL
PMV BLD AUTO: ABNORMAL FL
POTASSIUM SERPL-SCNC: 4.2 MMOL/L
RBC # BLD: ABNORMAL 10*6/UL
SODIUM BLD-SCNC: 145 MMOL/L
URIC ACID: 7.7
WBC # BLD: 9.3 10^3/ML

## 2019-12-12 ENCOUNTER — OFFICE VISIT (OUTPATIENT)
Dept: FAMILY MEDICINE CLINIC | Age: 79
End: 2019-12-12
Payer: COMMERCIAL

## 2019-12-12 VITALS
HEART RATE: 68 BPM | TEMPERATURE: 97.8 F | DIASTOLIC BLOOD PRESSURE: 76 MMHG | OXYGEN SATURATION: 98 % | SYSTOLIC BLOOD PRESSURE: 136 MMHG | RESPIRATION RATE: 16 BRPM | BODY MASS INDEX: 32.86 KG/M2 | WEIGHT: 229 LBS

## 2019-12-12 DIAGNOSIS — Z98.890 HISTORY OF LEFT-SIDED CAROTID ENDARTERECTOMY: ICD-10-CM

## 2019-12-12 DIAGNOSIS — I25.10 CORONARY ARTERY DISEASE INVOLVING NATIVE CORONARY ARTERY OF NATIVE HEART WITHOUT ANGINA PECTORIS: ICD-10-CM

## 2019-12-12 DIAGNOSIS — T14.8XXA ABRASION: ICD-10-CM

## 2019-12-12 DIAGNOSIS — N18.4 CHRONIC KIDNEY DISEASE (CKD), STAGE IV (SEVERE) (HCC): ICD-10-CM

## 2019-12-12 DIAGNOSIS — I50.22 CHRONIC SYSTOLIC CONGESTIVE HEART FAILURE (HCC): ICD-10-CM

## 2019-12-12 DIAGNOSIS — I10 ESSENTIAL HYPERTENSION: Primary | ICD-10-CM

## 2019-12-12 DIAGNOSIS — E11.8 TYPE 2 DIABETES MELLITUS WITH COMPLICATION, WITH LONG-TERM CURRENT USE OF INSULIN (HCC): ICD-10-CM

## 2019-12-12 DIAGNOSIS — J44.9 CHRONIC OBSTRUCTIVE PULMONARY DISEASE, UNSPECIFIED COPD TYPE (HCC): ICD-10-CM

## 2019-12-12 DIAGNOSIS — Z79.4 TYPE 2 DIABETES MELLITUS WITH COMPLICATION, WITH LONG-TERM CURRENT USE OF INSULIN (HCC): ICD-10-CM

## 2019-12-12 DIAGNOSIS — M1A.9XX0 CHRONIC GOUT WITHOUT TOPHUS, UNSPECIFIED CAUSE, UNSPECIFIED SITE: ICD-10-CM

## 2019-12-12 DIAGNOSIS — Z95.2 STATUS POST AORTIC VALVE REPLACEMENT: ICD-10-CM

## 2019-12-12 DIAGNOSIS — E78.00 PURE HYPERCHOLESTEROLEMIA: ICD-10-CM

## 2019-12-12 DIAGNOSIS — Z95.0 STATUS POST PLACEMENT OF CARDIAC PACEMAKER: ICD-10-CM

## 2019-12-12 DIAGNOSIS — Z95.1 STATUS POST AORTO-CORONARY ARTERY BYPASS GRAFT: ICD-10-CM

## 2019-12-12 DIAGNOSIS — D50.9 IRON DEFICIENCY ANEMIA, UNSPECIFIED IRON DEFICIENCY ANEMIA TYPE: ICD-10-CM

## 2019-12-12 PROCEDURE — 99214 OFFICE O/P EST MOD 30 MIN: CPT | Performed by: FAMILY MEDICINE

## 2019-12-12 ASSESSMENT — ENCOUNTER SYMPTOMS
DIARRHEA: 0
ABDOMINAL DISTENTION: 0
BACK PAIN: 0
NAUSEA: 0
RHINORRHEA: 0
VOMITING: 0
CHEST TIGHTNESS: 0
ABDOMINAL PAIN: 0
COUGH: 0
SHORTNESS OF BREATH: 0
SORE THROAT: 0
CONSTIPATION: 0

## 2020-01-06 ENCOUNTER — TELEPHONE (OUTPATIENT)
Dept: FAMILY MEDICINE CLINIC | Age: 80
End: 2020-01-06

## 2020-01-06 NOTE — TELEPHONE ENCOUNTER
Patient c/o left elbow is very Painful to touch,swollen moved down into wrist over a week unable to to put pressure on it.

## 2020-01-08 ENCOUNTER — OFFICE VISIT (OUTPATIENT)
Dept: FAMILY MEDICINE CLINIC | Age: 80
End: 2020-01-08
Payer: COMMERCIAL

## 2020-01-08 VITALS
WEIGHT: 230 LBS | SYSTOLIC BLOOD PRESSURE: 162 MMHG | RESPIRATION RATE: 16 BRPM | BODY MASS INDEX: 33 KG/M2 | DIASTOLIC BLOOD PRESSURE: 86 MMHG | TEMPERATURE: 97.8 F | OXYGEN SATURATION: 97 % | HEART RATE: 76 BPM

## 2020-01-08 PROCEDURE — 99213 OFFICE O/P EST LOW 20 MIN: CPT | Performed by: FAMILY MEDICINE

## 2020-01-08 RX ORDER — PREDNISONE 20 MG/1
TABLET ORAL
Qty: 17 TABLET | Refills: 0 | Status: SHIPPED | OUTPATIENT
Start: 2020-01-08 | End: 2020-01-18

## 2020-01-08 ASSESSMENT — ENCOUNTER SYMPTOMS
BACK PAIN: 0
SORE THROAT: 0
RHINORRHEA: 0
VOMITING: 0
SHORTNESS OF BREATH: 0
ABDOMINAL DISTENTION: 0
CONSTIPATION: 0
DIARRHEA: 0
ABDOMINAL PAIN: 0
CHEST TIGHTNESS: 0
COUGH: 0
NAUSEA: 0

## 2020-01-08 ASSESSMENT — PATIENT HEALTH QUESTIONNAIRE - PHQ9
SUM OF ALL RESPONSES TO PHQ9 QUESTIONS 1 & 2: 1
SUM OF ALL RESPONSES TO PHQ QUESTIONS 1-9: 1
2. FEELING DOWN, DEPRESSED OR HOPELESS: 1
1. LITTLE INTEREST OR PLEASURE IN DOING THINGS: 0
SUM OF ALL RESPONSES TO PHQ QUESTIONS 1-9: 1

## 2020-01-08 NOTE — PROGRESS NOTES
Kelsey Esteban MD  [address] [facility]  [address] [place] [facility] [address]  Dept: [phone]  Dept Fax: [phone]     Patient ID: Daja Vogel is a 78 y.o. male. HPI    Pt here today for an acute visit secondary to left elbow pain, redness, and swelling x 5 days. No trauma or fall. He has been icing and using an Ace Wrap. Pt denies any fever or chills. Pt today denies any HA, chest pain, or SOB. Pt denies any N/V/D/C or abdominal pain. Otherwise pt doing well on current tx and no other concerns today. The patient's past medical, surgical, social, and family history as well as his current medications and allergies were reviewed as documented in today's encounter. Current Outpatient Medications on File Prior to Visit   Medication Sig Dispense Refill    Tiotropium Bromide-Olodaterol (STIOLTO RESPIMAT) 2.5-2.5 MCG/ACT AERS 2 puffs daily 1 Inhaler 0    atorvastatin (LIPITOR) 20 MG tablet TAKE 1 TABLET BY MOUTH ONE TIME A DAY  90 tablet 3    tiZANidine (ZANAFLEX) 4 MG tablet Take 1 tablet by mouth 3 times daily 60 tablet 0    ciclopirox (LOPROX) 0.77 % cream Apply topically to feet 2 times daily.  1 Tube 3    pantoprazole (PROTONIX) 40 MG tablet Take 40 mg by mouth daily      Insulin Degludec 100 UNIT/ML SOLN Inject 20 Units into the skin daily      furosemide (LASIX) 40 MG tablet Take 40 mg by mouth daily      polyethylene glycol (GLYCOLAX) powder Take 17 g by mouth daily as needed      warfarin (COUMADIN) 4 MG tablet Take 4 mg by mouth Per Jobst      aspirin 81 MG EC tablet Take 81 mg by mouth daily      allopurinol (ZYLOPRIM) 100 MG tablet TAKE 2 TABLETS DAILY 180 tablet 3    metoprolol succinate (TOPROL XL) 25 MG extended release tablet Take 1 tablet by mouth daily (Patient taking differently: Take 12.5 mg by mouth daily ) 90 tablet 3    acetaminophen (TYLENOL) 500 MG tablet Take 500 mg by mouth       No current facility-administered medications on file prior to visit. Subjective:     Review of Systems   Constitutional: Negative for appetite change, fatigue and fever. HENT: Negative for congestion, ear pain, rhinorrhea and sore throat. Respiratory: Negative for cough, chest tightness and shortness of breath. Cardiovascular: Negative for chest pain and palpitations. Gastrointestinal: Negative for abdominal distention, abdominal pain, constipation, diarrhea, nausea and vomiting. Genitourinary: Negative for difficulty urinating and dysuria. Musculoskeletal: Positive for arthralgias (left elbow) and joint swelling (left elbow and redness). Negative for back pain and myalgias. Skin: Negative for rash. Neurological: Negative for dizziness, weakness, light-headedness and headaches. Hematological: Negative for adenopathy. Psychiatric/Behavioral: Negative for behavioral problems and sleep disturbance. The patient is not nervous/anxious. Objective:     Physical Exam  Vitals signs reviewed. Constitutional:       General: He is not in acute distress. Appearance: He is well-developed. HENT:      Head: Normocephalic and atraumatic. Right Ear: External ear normal.      Left Ear: External ear normal.      Nose: Nose normal.      Mouth/Throat:      Pharynx: No oropharyngeal exudate. Eyes:      Conjunctiva/sclera: Conjunctivae normal.      Pupils: Pupils are equal, round, and reactive to light. Neck:      Musculoskeletal: Normal range of motion. Cardiovascular:      Rate and Rhythm: Normal rate and regular rhythm. Heart sounds: Normal heart sounds. No murmur. Pulmonary:      Effort: Pulmonary effort is normal. No respiratory distress. Breath sounds: Normal breath sounds. No wheezing. Chest:      Chest wall: No tenderness. Abdominal:      General: Bowel sounds are normal. There is no distension. Palpations: Abdomen is soft. There is no mass. Tenderness:  There is

## 2020-01-08 NOTE — PROGRESS NOTES
09/05/2020    Diabetic foot exam  10/10/2020    Potassium monitoring  12/05/2020    Creatinine monitoring  12/05/2020    Flu vaccine  Completed    Pneumococcal 65+ years Vaccine  Completed    HPV vaccine  Aged Out     Patient/Caregiver verbalize understanding of medications.   Yes

## 2020-01-09 LAB
BASOPHILS ABSOLUTE: ABNORMAL
BASOPHILS RELATIVE PERCENT: ABNORMAL
C-REACTIVE PROTEIN: 2.8
EOSINOPHILS ABSOLUTE: ABNORMAL
EOSINOPHILS RELATIVE PERCENT: ABNORMAL
HCT VFR BLD CALC: 37.4 % (ref 41–53)
HEMOGLOBIN: 12 G/DL (ref 13.5–17.5)
LYMPHOCYTES ABSOLUTE: ABNORMAL
LYMPHOCYTES RELATIVE PERCENT: ABNORMAL
MCH RBC QN AUTO: ABNORMAL PG
MCHC RBC AUTO-ENTMCNC: ABNORMAL G/DL
MCV RBC AUTO: ABNORMAL FL
MONOCYTES ABSOLUTE: ABNORMAL
MONOCYTES RELATIVE PERCENT: ABNORMAL
NEUTROPHILS ABSOLUTE: ABNORMAL
NEUTROPHILS RELATIVE PERCENT: ABNORMAL
PDW BLD-RTO: ABNORMAL %
PLATELET # BLD: ABNORMAL 10*3/UL
PMV BLD AUTO: ABNORMAL FL
RBC # BLD: 3.85 10^6/ΜL
SEDIMENTATION RATE, ERYTHROCYTE: 52
URIC ACID: 8.6
WBC # BLD: 8.9 10^3/ML

## 2020-01-14 ENCOUNTER — OFFICE VISIT (OUTPATIENT)
Dept: FAMILY MEDICINE CLINIC | Age: 80
End: 2020-01-14
Payer: COMMERCIAL

## 2020-01-14 VITALS
DIASTOLIC BLOOD PRESSURE: 76 MMHG | SYSTOLIC BLOOD PRESSURE: 134 MMHG | TEMPERATURE: 98.2 F | HEART RATE: 63 BPM | BODY MASS INDEX: 33 KG/M2 | OXYGEN SATURATION: 98 % | WEIGHT: 230 LBS

## 2020-01-14 PROCEDURE — 99214 OFFICE O/P EST MOD 30 MIN: CPT | Performed by: NURSE PRACTITIONER

## 2020-01-14 ASSESSMENT — ENCOUNTER SYMPTOMS
ABDOMINAL DISTENTION: 0
BACK PAIN: 0
SHORTNESS OF BREATH: 0
VOMITING: 0
DIARRHEA: 0
SORE THROAT: 0
COUGH: 0
CHEST TIGHTNESS: 0
RHINORRHEA: 0
CONSTIPATION: 0
ABDOMINAL PAIN: 0
NAUSEA: 0

## 2020-01-14 NOTE — PROGRESS NOTES
Rainer Zavala, APRN-[facility]  [address] [facility]ICINE  [address] [place]  [facility]  Dept: [phone]  Dept Fax: [phone]     Patient ID: Mitzi Mari is a 78 y.o. male. HPI    Pt here today for an acute visit secondary to sustaining a fall in his bathroom this morning. He presents today with c/o rib pain. He also relates that he hit his head. He does have a history of a-fib and is on coumadin. He had his INR checked 1/3/2020 and it was 1.8. They did increase his coumadin from 6mg daily to 8 mg on Mondays and Fridays and 6mg Tues/Wed/Thurs/Sat/Sun. Pt denies any fever or chills. Pt today denies any HA, chest pain, or SOB. Pt denies any N/V/D/C or abdominal pain. Otherwise pt doing well on current tx and no other concerns today. The patient's past medical, surgical, social, and family history as well as his current medications and allergies were reviewed as documented in today's encounter. Current Outpatient Medications on File Prior to Visit   Medication Sig Dispense Refill    predniSONE (DELTASONE) 20 MG tablet Take 2 tabs daily x 4 days, then 1 tab daily x 6 days, then 1/2 tab daily x 6 days 17 tablet 0    Tiotropium Bromide-Olodaterol (STIOLTO RESPIMAT) 2.5-2.5 MCG/ACT AERS 2 puffs daily 1 Inhaler 0    atorvastatin (LIPITOR) 20 MG tablet TAKE 1 TABLET BY MOUTH ONE TIME A DAY  90 tablet 3    tiZANidine (ZANAFLEX) 4 MG tablet Take 1 tablet by mouth 3 times daily 60 tablet 0    ciclopirox (LOPROX) 0.77 % cream Apply topically to feet 2 times daily.  1 Tube 3    pantoprazole (PROTONIX) 40 MG tablet Take 40 mg by mouth daily      Insulin Degludec 100 UNIT/ML SOLN Inject 20 Units into the skin daily      furosemide (LASIX) 40 MG tablet Take 40 mg by mouth daily      polyethylene glycol (GLYCOLAX) powder Take 17 g by mouth daily as needed      warfarin (COUMADIN) 4 MG tablet Take 4 mg by mouth Per Jobst      aspirin 81 MG EC tablet Take 81 mg by mouth daily      allopurinol (ZYLOPRIM) 100 MG tablet TAKE 2 TABLETS DAILY 180 tablet 3    metoprolol succinate (TOPROL XL) 25 MG extended release tablet Take 1 tablet by mouth daily (Patient taking differently: Take 12.5 mg by mouth daily ) 90 tablet 3    acetaminophen (TYLENOL) 500 MG tablet Take 500 mg by mouth       No current facility-administered medications on file prior to visit. Subjective:     Review of Systems   Constitutional: Negative for activity change, fatigue and fever. HENT: Negative for congestion, ear pain, rhinorrhea and sore throat. Respiratory: Negative for cough, chest tightness and shortness of breath. Cardiovascular: Negative for chest pain and palpitations. Gastrointestinal: Negative for abdominal distention, abdominal pain, constipation, diarrhea, nausea and vomiting. Endocrine: Negative for polydipsia, polyphagia and polyuria. Genitourinary: Negative for difficulty urinating and dysuria. Musculoskeletal: Negative for arthralgias, back pain and myalgias. Skin: Negative for rash. Neurological: Negative for dizziness, weakness, light-headedness and headaches. Hematological: Negative for adenopathy. Psychiatric/Behavioral: Negative for agitation and behavioral problems. The patient is not nervous/anxious. Objective:     Physical Exam  Vitals signs reviewed. Constitutional:       General: He is not in acute distress. Appearance: Normal appearance. HENT:      Head: Normocephalic and atraumatic. Right Ear: External ear normal.      Left Ear: External ear normal.      Nose: Nose normal.      Mouth/Throat:      Mouth: Mucous membranes are moist.      Pharynx: No oropharyngeal exudate or posterior oropharyngeal erythema. Eyes:      Extraocular Movements: Extraocular movements intact. Conjunctiva/sclera: Conjunctivae normal.      Pupils: Pupils are equal, round, and reactive to light.    Neck: Musculoskeletal: Normal range of motion. Cardiovascular:      Rate and Rhythm: Normal rate and regular rhythm. Pulses: Normal pulses. Heart sounds: No murmur. Pulmonary:      Effort: Pulmonary effort is normal. No respiratory distress. Breath sounds: Normal breath sounds. No wheezing, rhonchi or rales. Abdominal:      General: Bowel sounds are normal. There is no distension. Palpations: Abdomen is soft. Musculoskeletal: Normal range of motion. Skin:     General: Skin is warm and dry. Findings: No rash. Neurological:      General: No focal deficit present. Mental Status: He is alert and oriented to person, place, and time. Deep Tendon Reflexes: Reflexes normal.      Comments: forgetful   Psychiatric:         Mood and Affect: Mood is anxious. Behavior: Behavior normal.         Cognition and Memory: Cognition is impaired. Memory is impaired. He exhibits impaired recent memory. Judgment: Judgment is impulsive. Assessment:      Diagnosis Orders   1. Fall, initial encounter  CT Head WO Contrast    XR RIBS RIGHT (2 VIEWS)   2. PAF (paroxysmal atrial fibrillation) ([address]  Protime-INR   3. Rib pain on right side         Plan:     1. Fall, initial encounter  -Given recent fall and history of A-fib with anticoagulation, will get STAT CT brain-will hold him for results    - CT Head WO Contrast; Future    2. PAF (paroxysmal atrial fibrillation) ([address]  -will recheck INR as his Coumadin has been increased since 1/3 when it was subtherapeutic.     - Protime-INR    3. Rib pain on right side  -Rib Xray ordered-will call with results    - XR RIBS RIGHT (2 VIEWS); Future    -instructed patient to get OTC SalonPas for pain relief.    -The patient is advised to apply ice or cold packs intermittently as needed to relieve pain.    -avoid NSAIDS r/t GFR of 31 and last Cr 2.07    -Although patient alert and oriented. He does seem a bit confused and forgetful.   We did place

## 2020-01-17 ENCOUNTER — TELEPHONE (OUTPATIENT)
Dept: FAMILY MEDICINE CLINIC | Age: 80
End: 2020-01-17

## 2020-01-17 NOTE — TELEPHONE ENCOUNTER
Attempted to reach patient for 2 days with test results and not able to on either phone listed so I did call his son Gale Gloria. He states the patient fell last night and didn't want to go to the ER, but he was confused so squad did transport him to [facility]. His brain CT was negative per the son, but they have admitted him. He said they are talking about discharging him already, but family is concerned because he still seems to be confused. He is on his way there and his brother is already there. I did ask that they keep us updated.

## 2020-01-17 NOTE — TELEPHONE ENCOUNTER
----- Message from JHONNY Londono NP sent at 1/15/2020  1:12 PM EST -----  Can you notify patient that he does have a right 11th rib fracture. Please let him know that treatment is really only pain control. I did suggest the Salonpas patches. 1 patch to the area/day. On for 12 hours off for 12 hours. Please encourage him to take deep breaths despite the pain.   He can also take Tylenol for the pain but would avoid NSAIDS due to his renal function and coumadin

## 2020-01-20 ENCOUNTER — TELEPHONE (OUTPATIENT)
Dept: FAMILY MEDICINE CLINIC | Age: 80
End: 2020-01-20

## 2020-01-20 NOTE — TELEPHONE ENCOUNTER
[facility] Transitions Initial Follow Up Call    Outreach made within 2 business days of discharge: Yes    Patient: Hina Branch Patient : 1940   MRN: [id number]  Reason for Admission: No discharge information exists for this patient. Discharge Date:         Spoke with: patient     Discharge department/facility: [facility] Patient Contact:  Was patient able to fill all prescriptions: Yes  Was patient instructed to bring all medications to the follow-up visit: Yes  Is patient taking all medications as directed in the discharge summary?  Yes  Does patient understand their discharge instructions: Yes  Does patient have questions or concerns that need addressed prior to 7-14 day follow up office visit: no    Scheduled appointment with PCP within 7-14 days    Follow Up  Future Appointments   Date Time Provider Harmeet Cole   2020 10:30 AM MD Elsa Quiroga Riff   2020 11:15 AM Dean Dandy, MD [facility] None   2020  8:30 AM Emigdio Chand BONIFACIO [facility]   3/16/2020  8:45 AM Courtney Perez MD [address] [place] [address]

## 2020-01-22 ENCOUNTER — TELEPHONE (OUTPATIENT)
Dept: FAMILY MEDICINE CLINIC | Age: 80
End: 2020-01-22

## 2020-01-23 ENCOUNTER — OFFICE VISIT (OUTPATIENT)
Dept: FAMILY MEDICINE CLINIC | Age: 80
End: 2020-01-23
Payer: COMMERCIAL

## 2020-01-23 VITALS
SYSTOLIC BLOOD PRESSURE: 152 MMHG | WEIGHT: 226 LBS | TEMPERATURE: 98.7 F | OXYGEN SATURATION: 98 % | DIASTOLIC BLOOD PRESSURE: 80 MMHG | RESPIRATION RATE: 16 BRPM | HEART RATE: 66 BPM | BODY MASS INDEX: 32.43 KG/M2

## 2020-01-23 PROCEDURE — 1111F DSCHRG MED/CURRENT MED MERGE: CPT | Performed by: FAMILY MEDICINE

## 2020-01-23 PROCEDURE — 99496 TRANSJ CARE MGMT HIGH F2F 7D: CPT | Performed by: FAMILY MEDICINE

## 2020-01-23 NOTE — PROGRESS NOTES
Visit Information    Have you changed or started any medications since your last visit including any over-the-counter medicines, vitamins, or herbal medicines? no   Have you stopped taking any of your medications? Is so, why? -  no  Are you having any side effects from any of your medications? - no    Have you seen any other physician or provider since your last visit?  no   Have you had any other diagnostic tests since your last visit? yes - while hospitalized   Have you been seen in the emergency room and/or had an admission in a hospital since we last saw you?  yes - ER X's 2 after falls. Have you had your routine dental cleaning in the past 6 months?  no     Do you have an active MyChart account? If no, what is the barrier?   Yes    Patient Care Team:  Satinder Garcia MD as PCP - General (Family Medicine)  Satinder Garcia MD as PCP - [facility]  Laila Ruvalcaba MD as Consulting Physician (Nephrology)  Bunny Castleman, MD as Consulting Physician (Urology)  Stanislav Isaac DPM as Consulting Physician (Podiatry)  Rhiannon Veliz MD as Consulting Physician (Cardiology)  Carrie Golden MD as Consulting Physician (Internal Medicine Cardiovascular Disease)  Randolph Hannon MD as Consulting Physician (Cardiology)  Bijal Weller MD as Consulting Physician (Internal Medicine)  William Hebert MD as Surgeon (Vascular Surgery)  Pavan Portillo MD as Surgeon (Internal Medicine Cardiovascular Disease)  Tova Blank as Consulting Physician (Thoracic Surgery)    Medical History Review  Past Medical, Family, and Social History reviewed and does not contribute to the patient presenting condition    Health Maintenance   Topic Date Due    Annual Wellness Visit (AWV)  05/29/2019    Shingles Vaccine (1 of 2) 10/21/2020 (Originally 5/14/1988)    DTaP/Tdap/Td vaccine (1 - Tdap) 11/18/2021 (Originally 5[birth year]14/1951)    Hepatitis B vaccine (1 of 3 - Risk 3-dose series) 04/10/2024 (Originally 5/14/1957)    Lipid screen  09/05/2020    Diabetic foot exam  10/10/2020    Potassium monitoring  12/05/2020    Creatinine monitoring  12/05/2020    Flu vaccine  Completed    Pneumococcal 65+ years Vaccine  Completed    HPV vaccine  Aged Out     Patient/Caregiver verbalize understanding of medications.   Yes

## 2020-01-23 NOTE — PROGRESS NOTES
Post-Discharge Transitional Care Management Services or Hospital Follow Up      Kevin Pedersen   YOB: 1940    Date of Office Visit:  1/23/2020  Date of Hospital Admission:    Date of Hospital Discharge:    Risk of hospital readmission (high >=14%. Medium >=10%) :No data recorded    Care management risk score Rising risk (score 2-5) and Complex Care (Scores >=6): 5     Non face to face  following discharge, date last encounter closed (first attempt may have been earlier): 1/20/2020  4:36 PM    Call initiated 2 business days of discharge: Yes    Patient Active Problem List   Diagnosis    Sleep apnea    Iron deficiency anemia    Chronic kidney disease (CKD), stage IV (severe) ([address]    Essential hypertension    Chronic obstructive pulmonary disease ([address]    Degeneration of intervertebral disc of lumbosacral region    Malignant neoplasm of urinary bladder ([address]    Pure hypercholesterolemia    Type 2 diabetes mellitus with complication, with long-term current use of insulin ([address]    Chronic gout without tophus    Status post placement of cardiac pacemaker    Postoperative atrial fibrillation (HCC)    Aortic valve stenosis    Diastolic dysfunction with chronic heart failure ([address]    Nonrheumatic aortic valve insufficiency    Systolic congestive heart failure ([address]    Coronary artery disease involving native coronary artery of native heart without angina pectoris    Left carotid stenosis    Status post aorto-coronary artery bypass graft    Status post aortic valve replacement    History of left-sided carotid endarterectomy    Gastrointestinal hemorrhage       Allergies   Allergen Reactions    Pcn [Penicillins]      burning  Hot/redness at injection site.        Medications listed as ordered at the time of discharge from hospital   [address] [place] [address] Medication Instructions RONAN:    Printed on:01/23/20 1112   Medication Information                      acetaminophen (TYLENOL) systems was negative except for what was noted in the HPI. Vitals:    01/23/20 1042 01/23/20 1043   BP: (!) 166/86 (!) 152/80   Pulse: 66    Resp: 16    Temp: 98.7 °F (37.1 °C)    SpO2: 98%    Weight: 226 lb (102.5 kg)      Body mass index is 32.43 kg/m². Wt Readings from Last 3 Encounters:   01/23/20 226 lb (102.5 kg)   01/14/20 230 lb (104.3 kg)   01/08/20 230 lb (104.3 kg)     BP Readings from Last 3 Encounters:   01/23/20 (!) 152/80   01/14/20 134/76   01/08/20 (!) 162/86        Physical Exam:  General Appearance: alert and oriented to person, place and time, well-developed and well-nourished, in no acute distress  Pulmonary/Chest: clear to auscultation bilaterally- no wheezes, rales or rhonchi, normal air movement, no respiratory distress; mild tenderness right lower ribs  Cardiovascular: normal rate, regular rhythm and no murmurs  Abdomen: soft, non-tender, non-distended, normal bowel sounds, no masses or organomegaly  Extremities: no edema and Yo's sign negative bilaterally; nickel sized abrasion on the left anterior shin    Hospital reports reviewed with pt again today    Assessment/Plan:   Diagnosis Orders   1. Syncope, unspecified syncope type  NV DISCHARGE MEDS RECONCILED W/ CURRENT OUTPATIENT MED LIST   2. Status post fall  NV DISCHARGE MEDS RECONCILED W/ CURRENT OUTPATIENT MED LIST   3. Confusion  NV DISCHARGE MEDS RECONCILED W/ CURRENT OUTPATIENT MED LIST    External Referral To Occupational Therapy    resolved   4. Post concussive syndrome  NV DISCHARGE MEDS RECONCILED W/ CURRENT OUTPATIENT MED LIST   5. Closed fracture of one rib of right side with routine healing, subsequent encounter  NV DISCHARGE MEDS RECONCILED W/ CURRENT OUTPATIENT MED LIST    7th rib   6. Essential hypertension     7. Type 2 diabetes mellitus with complication, with long-term current use of insulin ([address]     8. Chronic kidney disease (CKD), stage IV (severe) ([address]     9.  Abrasion of anterior lower leg, left, initial

## 2020-01-24 ENCOUNTER — TELEPHONE (OUTPATIENT)
Dept: FAMILY MEDICINE CLINIC | Age: 80
End: 2020-01-24

## 2020-01-28 PROBLEM — R55 SYNCOPE: Status: ACTIVE | Noted: 2020-01-17

## 2020-01-28 PROBLEM — S81.802A LEG WOUND, LEFT, INITIAL ENCOUNTER: Status: ACTIVE | Noted: 2020-01-17

## 2020-03-02 ENCOUNTER — TELEPHONE (OUTPATIENT)
Dept: FAMILY MEDICINE CLINIC | Age: 80
End: 2020-03-02

## 2020-03-13 RX ORDER — LANCETS
EACH MISCELLANEOUS DAILY
COMMUNITY
End: 2020-03-13 | Stop reason: SDUPTHER

## 2020-03-13 RX ORDER — CALCIUM CITRATE/VITAMIN D3 200MG-6.25
1 TABLET ORAL DAILY
Qty: 200 EACH | Refills: 11 | Status: SHIPPED | OUTPATIENT
Start: 2020-03-13 | End: 2020-04-03 | Stop reason: SDUPTHER

## 2020-03-13 RX ORDER — LANCETS
1 EACH MISCELLANEOUS DAILY
Qty: 90 EACH | Refills: 3 | Status: SHIPPED | OUTPATIENT
Start: 2020-03-13 | End: 2020-11-05 | Stop reason: SDUPTHER

## 2020-03-13 NOTE — TELEPHONE ENCOUNTER
Please print out test strips and lancets rx.   Needs to be faxed to [facility]  equipment F# [phone]

## 2020-04-03 ENCOUNTER — OFFICE VISIT (OUTPATIENT)
Dept: FAMILY MEDICINE CLINIC | Age: 80
End: 2020-04-03
Payer: COMMERCIAL

## 2020-04-03 VITALS
RESPIRATION RATE: 16 BRPM | SYSTOLIC BLOOD PRESSURE: 130 MMHG | OXYGEN SATURATION: 97 % | WEIGHT: 222 LBS | HEART RATE: 78 BPM | DIASTOLIC BLOOD PRESSURE: 74 MMHG | BODY MASS INDEX: 31.85 KG/M2 | TEMPERATURE: 98.6 F

## 2020-04-03 LAB — HBA1C MFR BLD: 7.4 %

## 2020-04-03 PROCEDURE — 99214 OFFICE O/P EST MOD 30 MIN: CPT | Performed by: FAMILY MEDICINE

## 2020-04-03 PROCEDURE — 83036 HEMOGLOBIN GLYCOSYLATED A1C: CPT | Performed by: FAMILY MEDICINE

## 2020-04-03 RX ORDER — METOPROLOL SUCCINATE 25 MG/1
12.5 TABLET, EXTENDED RELEASE ORAL DAILY
Qty: 45 TABLET | Refills: 3 | Status: SHIPPED | OUTPATIENT
Start: 2020-04-03

## 2020-04-03 RX ORDER — FUROSEMIDE 40 MG/1
40 TABLET ORAL DAILY
Qty: 90 TABLET | Refills: 3 | Status: SHIPPED | OUTPATIENT
Start: 2020-04-03

## 2020-04-03 RX ORDER — ALLOPURINOL 100 MG/1
TABLET ORAL
Qty: 180 TABLET | Refills: 3 | Status: SHIPPED | OUTPATIENT
Start: 2020-04-03

## 2020-04-03 ASSESSMENT — ENCOUNTER SYMPTOMS
BACK PAIN: 0
ABDOMINAL DISTENTION: 0
NAUSEA: 0
SORE THROAT: 0
ABDOMINAL PAIN: 0
DIARRHEA: 0
COUGH: 0
VOMITING: 0
CHEST TIGHTNESS: 0
RHINORRHEA: 0
WHEEZING: 1
CONSTIPATION: 0

## 2020-04-03 NOTE — PROGRESS NOTES
Kelsey Appiah MD    [address] [facility]  [id number]  [address] [address]  Dept: [phone]  Dept Fax: [phone]     Patient ID: Jose A Brothers is a 78 y.o. male. HPI    Pt here today for f/u on chronic medical problems HTN, HLD, DM, CKD, CHF, COPD, Anemia, CAD, Gout, ,go over labs and/or diagnostic studies, and medication refills. Pt denies any fever or chills. Pt today denies any HA, chest pain, or SOB. Pt denies any N/V/D/C or abdominal pain. Pt is still living with his son and daughter-in-law and he has taken a couple of falls. He did do the 's test, but has not heard the results. Pt denies any gouty attacks. His BS's are running 's. He states he has not been using his inhalers as prescribed. He has been staying in with the Covid-19 restrictions and seems to be doing pretty good. He is planning on going out to [facility] later this month to visit his daughter and see 3 new great grandchildren. Otherwise pt doing well on current tx and no other concerns today. The patient's past medical, surgical, social, and family history as well as his current medications and allergies were reviewed as documented in today's encounter.       Current Outpatient Medications on File Prior to Visit   Medication Sig Dispense Refill    Lancets Thin MISC 1 Device by Does not apply route daily 90 each 3    Tiotropium Bromide-Olodaterol (STIOLTO RESPIMAT) 2.5-2.5 MCG/ACT AERS 2 puffs daily 1 Inhaler 0    atorvastatin (LIPITOR) 20 MG tablet TAKE 1 TABLET BY MOUTH ONE TIME A DAY  90 tablet 3    pantoprazole (PROTONIX) 40 MG tablet Take 40 mg by mouth daily      polyethylene glycol (GLYCOLAX) powder Take 17 g by mouth daily as needed      warfarin (COUMADIN) 4 MG tablet Take 4 mg by mouth Per Jobst      aspirin 81 MG EC tablet Take 81 mg by mouth daily      acetaminophen (TYLENOL) 500 MG tablet Take 500 mg by mouth       No current tenderness. Musculoskeletal: Normal range of motion. General: No tenderness. Right lower leg: Edema (mild) present. Left lower leg: Edema (mild) present. Lymphadenopathy:      Cervical: No cervical adenopathy. Skin:     Findings: No rash. Neurological:      Mental Status: He is alert and oriented to person, place, and time. Deep Tendon Reflexes: Reflexes are normal and symmetric. Psychiatric:         Behavior: Behavior normal.     HGBA1C done in the office was 7.4%    Assessment:      Diagnosis Orders   1. Essential hypertension  CBC    Comprehensive Metabolic Panel   2. Pure hypercholesterolemia  Lipid Panel   3. Type 2 diabetes mellitus with complication, with long-term current use of insulin ([facility])  Comprehensive Metabolic Panel    Hemoglobin A1C   4. Chronic kidney disease (CKD), stage IV (severe) ([facility])  Comprehensive Metabolic Panel   5. Chronic systolic congestive heart failure ([facility])  Comprehensive Metabolic Panel   6. Chronic obstructive pulmonary disease, unspecified COPD type ([facility]  Comprehensive Metabolic Panel   7. Iron deficiency anemia, unspecified iron deficiency anemia type  CBC   8. Coronary artery disease involving native coronary artery of native heart without angina pectoris  Comprehensive Metabolic Panel    Lipid Panel   9.  Chronic gout without tophus, unspecified cause, unspecified site  Uric Acid         Plan:     Orders Placed This Encounter   Procedures    CBC     Standing Status:   Future     Standing Expiration Date:   4/3/2021    Comprehensive Metabolic Panel     Standing Status:   Future     Standing Expiration Date:   4/3/2021    Hemoglobin A1C     Standing Status:   Future     Standing Expiration Date:   4/3/2021    Lipid Panel     Standing Status:   Future     Standing Expiration Date:   4/3/2021     Order Specific Question:   Is Patient Fasting?/# of Hours     Answer:   8-10 Hours    Uric Acid     Standing Status:   Future     Standing Expiration Date:   4/3/2021      Orders Placed This Encounter   Medications    metoprolol succinate (TOPROL XL) 25 MG extended release tablet     Sig: Take 0.5 tablets by mouth daily     Dispense:  45 tablet     Refill:  3    allopurinol (ZYLOPRIM) 100 MG tablet     Sig: TAKE 2 TABLETS DAILY     Dispense:  180 tablet     Refill:  3    furosemide (LASIX) 40 MG tablet     Sig: Take 1 tablet by mouth daily     Dispense:  90 tablet     Refill:  3    Insulin Degludec 100 UNIT/ML SOLN     Sig: Inject 20 Units into the skin daily     Dispense:  60 mL     Refill:  11     Will cont with current treatment as pt is currently stable on current treatment    Continue with ADA diet, current diabetic meds, and monitoring BS's as instructed and more strict with his diet    No Ace-I/ARB secondary to CKD    Cont with daily foot exams and yearly eye exams    Will cont with low fat/chol diet and Lipitor as ordered    Increase fluids throughout the day    I did go over the 's test and they do recommend him not to drive and I did go over this with him and his daughter-in-law who is with him that he can no longer drive    Will get him back on the Stiolto Inhaler as prescribed    Will cont to follow with Dr. Yazmin Krishnan as instructed    Will cont to follow with Dr. Sapphire Bond as instructed    Rest of systems unchanged, continue current treatments. Medications, labs, diagnostic studies, consultations and follow-up as documented in this encounter. Rest of systems unchanged, continue current treatments    Kelsey Elam MD

## 2020-04-03 NOTE — PROGRESS NOTES
Review  Past Medical, Family, and Social History reviewed and does contribute to the patient presenting condition    Health Maintenance   Topic Date Due    Annual Wellness Visit (AWV)  05/29/2019    Shingles Vaccine (1 of 2) 10/21/2020 (Originally 5/14/1988)    DTaP/Tdap/Td vaccine (1 - Tdap) 11/18/2021 (Originally 5/14/1957)    A1C test (Diabetic or Prediabetic)  06/05/2020    Lipid screen  09/05/2020    Diabetic retinal exam  09/27/2020    Diabetic foot exam  10/10/2020    Potassium monitoring  03/09/2021    Creatinine monitoring  03/09/2021    Flu vaccine  Completed    Pneumococcal 65+ yrs at Risk Vaccine  Completed    Hepatitis A vaccine  Aged Out    Hib vaccine  Aged Out    Meningococcal (ACWY) vaccine  Aged Out   Patient/Caregiver verbalize understanding of medications.   Yes

## 2020-04-14 ENCOUNTER — TELEPHONE (OUTPATIENT)
Dept: OTHER | Facility: CLINIC | Age: 80
End: 2020-04-14

## 2020-07-06 LAB
ALBUMIN SERPL-MCNC: 3.7 G/DL
ALP BLD-CCNC: 58 U/L
ALT SERPL-CCNC: 17 U/L
ANION GAP SERPL CALCULATED.3IONS-SCNC: 8 MMOL/L
AST SERPL-CCNC: 23 U/L
AVERAGE GLUCOSE: 171
BASOPHILS ABSOLUTE: ABNORMAL
BASOPHILS RELATIVE PERCENT: ABNORMAL
BILIRUB SERPL-MCNC: 1.2 MG/DL (ref 0.1–1.4)
BUN BLDV-MCNC: 46 MG/DL
CALCIUM SERPL-MCNC: 9.6 MG/DL
CHLORIDE BLD-SCNC: 99 MMOL/L
CHOLESTEROL, TOTAL: 94 MG/DL
CHOLESTEROL/HDL RATIO: 2.9
CO2: 32 MMOL/L
CREAT SERPL-MCNC: 2.13 MG/DL
EOSINOPHILS ABSOLUTE: ABNORMAL
EOSINOPHILS RELATIVE PERCENT: ABNORMAL
GFR CALCULATED: 30
GLUCOSE BLD-MCNC: 163 MG/DL
HBA1C MFR BLD: 7.6 %
HCT VFR BLD CALC: 39.1 % (ref 41–53)
HDLC SERPL-MCNC: 32 MG/DL (ref 35–70)
HEMOGLOBIN: 12.4 G/DL (ref 13.5–17.5)
LDL CHOLESTEROL CALCULATED: 47 MG/DL (ref 0–160)
LYMPHOCYTES ABSOLUTE: ABNORMAL
LYMPHOCYTES RELATIVE PERCENT: ABNORMAL
MCH RBC QN AUTO: ABNORMAL PG
MCHC RBC AUTO-ENTMCNC: ABNORMAL G/DL
MCV RBC AUTO: ABNORMAL FL
MONOCYTES ABSOLUTE: ABNORMAL
MONOCYTES RELATIVE PERCENT: ABNORMAL
NEUTROPHILS ABSOLUTE: ABNORMAL
NEUTROPHILS RELATIVE PERCENT: ABNORMAL
PLATELET # BLD: 158 K/ΜL
PMV BLD AUTO: ABNORMAL FL
POTASSIUM SERPL-SCNC: 4.5 MMOL/L
RBC # BLD: ABNORMAL 10*6/UL
SODIUM BLD-SCNC: 139 MMOL/L
TOTAL PROTEIN: 7.5
TRIGL SERPL-MCNC: 75 MG/DL
URIC ACID: 8.1
URIC ACID: 8.1
VLDLC SERPL CALC-MCNC: 15 MG/DL
WBC # BLD: 8.8 10^3/ML

## 2020-07-09 ENCOUNTER — OFFICE VISIT (OUTPATIENT)
Dept: FAMILY MEDICINE CLINIC | Age: 80
End: 2020-07-09
Payer: COMMERCIAL

## 2020-07-09 VITALS
TEMPERATURE: 98 F | DIASTOLIC BLOOD PRESSURE: 84 MMHG | BODY MASS INDEX: 32.86 KG/M2 | OXYGEN SATURATION: 97 % | HEART RATE: 72 BPM | WEIGHT: 229 LBS | SYSTOLIC BLOOD PRESSURE: 142 MMHG | RESPIRATION RATE: 18 BRPM

## 2020-07-09 PROCEDURE — 99214 OFFICE O/P EST MOD 30 MIN: CPT | Performed by: FAMILY MEDICINE

## 2020-07-09 PROCEDURE — 3051F HG A1C>EQUAL 7.0%<8.0%: CPT | Performed by: FAMILY MEDICINE

## 2020-07-09 RX ORDER — TIOTROPIUM BROMIDE AND OLODATEROL 3.124; 2.736 UG/1; UG/1
SPRAY, METERED RESPIRATORY (INHALATION)
Qty: 3 INHALER | Refills: 0 | COMMUNITY
Start: 2020-07-09 | End: 2020-11-10 | Stop reason: SDUPTHER

## 2020-07-09 ASSESSMENT — PATIENT HEALTH QUESTIONNAIRE - PHQ9
1. LITTLE INTEREST OR PLEASURE IN DOING THINGS: 0
SUM OF ALL RESPONSES TO PHQ QUESTIONS 1-9: 0
SUM OF ALL RESPONSES TO PHQ QUESTIONS 1-9: 0
2. FEELING DOWN, DEPRESSED OR HOPELESS: 0
SUM OF ALL RESPONSES TO PHQ9 QUESTIONS 1 & 2: 0

## 2020-07-09 ASSESSMENT — ENCOUNTER SYMPTOMS
ABDOMINAL PAIN: 0
ABDOMINAL DISTENTION: 0
CONSTIPATION: 0
NAUSEA: 0
VOMITING: 0
CHEST TIGHTNESS: 0
SORE THROAT: 0
COUGH: 1
COLOR CHANGE: 1
SHORTNESS OF BREATH: 0
RHINORRHEA: 0
DIARRHEA: 0
BACK PAIN: 0

## 2020-07-09 NOTE — PROGRESS NOTES
Kelsey Duval MD    [address] [facility]  [id number]  [address] [address]  Dept: [phone]  Dept Fax: [phone]     Patient ID: Viridiana Borjas is a 78 y.o. male. HPI    Pt here today for f/u on chronic medical problems HTN, HLD, DM, CKD, COPD, CAD, CHF, Anemia, Gout,go over labs and/or diagnostic studies, and medication refills. Pt denies any fever or chills. Pt today denies any HA, chest pain, or SOB. Pt denies any N/V/D/C or abdominal pain. Pt has been c/o a productive cough of clear sputum. Pt denies any gouty attacks. BS's outside the office running 130-150's. Pt is now living with his son and daughter-in-law and she has noticed some forgetfulness and memory loss, but pt doesn't think he has a problem. Otherwise pt doing well on current tx and no other concerns today. The patient's past medical, surgical, social, and family history as well as his current medications and allergies were reviewed as documented in today's encounter.       Current Outpatient Medications on File Prior to Visit   Medication Sig Dispense Refill    metoprolol succinate (TOPROL XL) 25 MG extended release tablet Take 0.5 tablets by mouth daily 45 tablet 3    allopurinol (ZYLOPRIM) 100 MG tablet TAKE 2 TABLETS DAILY 180 tablet 3    furosemide (LASIX) 40 MG tablet Take 1 tablet by mouth daily 90 tablet 3    Lancets Thin MISC 1 Device by Does not apply route daily 90 each 3    Tiotropium Bromide-Olodaterol (STIOLTO RESPIMAT) 2.5-2.5 MCG/ACT AERS 2 puffs daily 1 Inhaler 0    atorvastatin (LIPITOR) 20 MG tablet TAKE 1 TABLET BY MOUTH ONE TIME A DAY  90 tablet 3    pantoprazole (PROTONIX) 40 MG tablet Take 40 mg by mouth daily      polyethylene glycol (GLYCOLAX) powder Take 17 g by mouth daily as needed      warfarin (COUMADIN) 4 MG tablet Take 4 mg by mouth Per Jobst      aspirin 81 MG EC tablet Take 81 mg by mouth daily      acetaminophen (TYLENOL) 500 MG tablet Take 500 mg by mouth       No current facility-administered medications on file prior to visit. Subjective:     Review of Systems   Constitutional: Negative for appetite change, fatigue and fever. HENT: Negative for congestion, ear pain, rhinorrhea and sore throat. Respiratory: Positive for cough. Negative for chest tightness and shortness of breath. Cardiovascular: Negative for chest pain and palpitations. Gastrointestinal: Negative for abdominal distention, abdominal pain, constipation, diarrhea, nausea and vomiting. Genitourinary: Negative for difficulty urinating and dysuria. Musculoskeletal: Negative for arthralgias, back pain and myalgias. Skin: Positive for color change (LLE). Negative for rash. Neurological: Negative for dizziness, weakness, light-headedness and headaches. Hematological: Negative for adenopathy. Psychiatric/Behavioral: Negative for behavioral problems and sleep disturbance. The patient is not nervous/anxious. Objective:     Physical Exam  Vitals signs reviewed. Constitutional:       General: He is not in acute distress. Appearance: He is well-developed. HENT:      Head: Normocephalic and atraumatic. Right Ear: External ear normal.      Left Ear: External ear normal.      Nose: Nose normal.      Mouth/Throat:      Pharynx: No oropharyngeal exudate. Eyes:      Conjunctiva/sclera: Conjunctivae normal.      Pupils: Pupils are equal, round, and reactive to light. Neck:      Musculoskeletal: Normal range of motion. Cardiovascular:      Rate and Rhythm: Normal rate and regular rhythm. Heart sounds: Normal heart sounds. No murmur. Pulmonary:      Effort: Pulmonary effort is normal. No respiratory distress. Breath sounds: Examination of the right-upper field reveals wheezing. Examination of the left-upper field reveals wheezing. Wheezing present. Chest:      Chest wall: No tenderness.    Abdominal:

## 2020-07-09 NOTE — PROGRESS NOTES
Visit Information    Have you changed or started any medications since your last visit including any over-the-counter medicines, vitamins, or herbal medicines? no   Have you stopped taking any of your medications? Is so, why? -  no  Are you having any side effects from any of your medications? - no    Have you seen any other physician or provider since your last visit?  no   Have you had any other diagnostic tests since your last visit?  no   Have you been seen in the emergency room and/or had an admission in a hospital since we last saw you?  no   Have you had your routine dental cleaning in the past 6 months?  no     Do you have an active MyChart account? If no, what is the barrier?   Yes    Patient Care Team:  Kassie Chavez MD as PCP - General (Family Medicine)  Kassie Chavez MD as PCP - [facility]  César Jang MD as Consulting Physician (Nephrology)  Caitie Aly MD as Consulting Physician (Urology)  Radha Thorne DPM as Consulting Physician (Podiatry)  Hina Cobian MD as Consulting Physician (Cardiology)  Shannan Mendoza MD as Consulting Physician (Internal Medicine Cardiovascular Disease)  Dequan Fountain MD as Consulting Physician (Cardiology)  Shonna Chacon MD as Consulting Physician (Internal Medicine)  Panchito Orlando MD as Surgeon (Vascular Surgery)  Curtis De Paz MD as Surgeon (Internal Medicine Cardiovascular Disease)  Donta Estevez as Consulting Physician (Thoracic Surgery)    Medical History Review  Past Medical, Family, and Social History reviewed and does contribute to the patient presenting condition    Health Maintenance   Topic Date Due    Annual Wellness Visit (AWV)  05/29/2019    Shingles Vaccine (1 of 2) 10/21/2020 (Originally 5/14/1988)    DTaP/Tdap/Td vaccine (1 - Tdap) 11/18/2021 (Originally 5/14/1957)    Flu vaccine (1) 09/01/2020    Diabetic retinal exam  09/27/2020    Diabetic foot exam  10/10/2020    A1C test (Diabetic or

## 2020-07-29 ENCOUNTER — OFFICE VISIT (OUTPATIENT)
Dept: FAMILY MEDICINE CLINIC | Age: 80
End: 2020-07-29
Payer: COMMERCIAL

## 2020-07-29 VITALS
BODY MASS INDEX: 32.57 KG/M2 | SYSTOLIC BLOOD PRESSURE: 134 MMHG | DIASTOLIC BLOOD PRESSURE: 74 MMHG | RESPIRATION RATE: 16 BRPM | WEIGHT: 227 LBS | OXYGEN SATURATION: 96 % | TEMPERATURE: 97.4 F | HEART RATE: 70 BPM

## 2020-07-29 PROCEDURE — 99214 OFFICE O/P EST MOD 30 MIN: CPT | Performed by: FAMILY MEDICINE

## 2020-07-29 RX ORDER — DONEPEZIL HYDROCHLORIDE 5 MG/1
5 TABLET, FILM COATED ORAL NIGHTLY
Qty: 30 TABLET | Refills: 3 | Status: SHIPPED | OUTPATIENT
Start: 2020-07-29 | End: 2020-09-01 | Stop reason: SDUPTHER

## 2020-07-29 ASSESSMENT — ENCOUNTER SYMPTOMS
DIARRHEA: 0
ABDOMINAL DISTENTION: 0
CONSTIPATION: 0
ABDOMINAL PAIN: 0
CHEST TIGHTNESS: 0
BACK PAIN: 0
COUGH: 0
VOMITING: 0
RHINORRHEA: 0
SORE THROAT: 0
NAUSEA: 0
SHORTNESS OF BREATH: 0

## 2020-07-29 NOTE — PROGRESS NOTES
Kelsey Ruelas MD  [address] [place] [address] [facility]ILY [facility]  [id number]  [address] [address]  Dept: [phone]  Dept Fax: [phone]     Patient ID: Ruiz Tucker is a 78 y.o. male. HPI    Pt here today for an acute visit secondary to his family concerned about his memory. Pt himself does not think he has a problem. He is now living with his son and daughter-in-law and they have really notice a big difference in his memory since his open heart surgery. His daughter-in-law states it is his short term memory. His long term memory is great. She states he cannot remember things form day to day or things that he was just told. He cannot remember what he had to eat a few hours after he ate. He repeats himself. She states he is having no problems with his ADL's. She states he has gotten worse over the past several months. Pt denies any fever or chills. Pt today denies any HA, chest pain, or SOB. Pt denies any N/V/D/C or abdominal pain. Otherwise pt doing well on current tx and no other concerns today. The patient's past medical, surgical, social, and family history as well as his current medications and allergies were reviewed as documented in today's encounter.       Current Outpatient Medications on File Prior to Visit   Medication Sig Dispense Refill    Insulin Degludec 100 UNIT/ML SOLN Inject 26 Units into the skin daily 60 mL 11    Tiotropium Bromide-Olodaterol (STIOLTO RESPIMAT) 2.5-2.5 MCG/ACT AERS 2 puffs daily 3 Inhaler 0    metoprolol succinate (TOPROL XL) 25 MG extended release tablet Take 0.5 tablets by mouth daily 45 tablet 3    allopurinol (ZYLOPRIM) 100 MG tablet TAKE 2 TABLETS DAILY 180 tablet 3    furosemide (LASIX) 40 MG tablet Take 1 tablet by mouth daily 90 tablet 3    Lancets Thin MISC 1 Device by Does not apply route daily 90 each 3    atorvastatin (LIPITOR) 20 MG tablet TAKE 1 TABLET BY MOUTH ONE TIME A DAY  90 tablet 3    Standing Expiration Date:   7/29/2021    TSH without Reflex     Standing Status:   Future     Standing Expiration Date:   7/29/2021    Vitamin B12     Standing Status:   Future     Standing Expiration Date:   7/29/2021    T. Pallidum Ab     Standing Status:   Future     Standing Expiration Date:   7/29/2021    Urinalysis     Standing Status:   Future     Standing Expiration Date:   7/29/2021     Order Specific Question:   SPECIFY(EX-CATH,MIDSTREAM,CYSTO,ETC)? Answer:   [place] Stream      Orders Placed This Encounter   Medications    donepezil (ARICEPT) 5 MG tablet     Sig: Take 1 tablet by mouth nightly     Dispense:  30 tablet     Refill:  3     I did have a long d/w pt and pt's daughter-in-law about etiology and treatment for memory loss    He had a recent CT Brain 6 months ago and showed nothing acute    Will get above labs for a possible secondary cause of his memory loss and follow up on results    He did score a 26/30 on his MMSE and I am going to start him on Aricept 5mg at night and will have him back in 1 month to see how he is doing and if tolerating, will increase the dose    Rest of systems unchanged, continue current treatments. Medications, labs, diagnostic studies, consultations and follow-up as documented in this encounter. Rest of systems unchanged, continue current treatments    Kelsey Berry MD

## 2020-07-29 NOTE — PROGRESS NOTES
Visit Information    Have you changed or started any medications since your last visit including any over-the-counter medicines, vitamins, or herbal medicines? no   Have you stopped taking any of your medications? Is so, why? -  no  Are you having any side effects from any of your medications? - no    Have you seen any other physician or provider since your last visit?  no   Have you had any other diagnostic tests since your last visit?  no   Have you been seen in the emergency room and/or had an admission in a hospital since we last saw you?  no   Have you had your routine dental cleaning in the past 6 months?  no     Do you have an active MyChart account? If no, what is the barrier?   Yes    Patient Care Team:  Bonne Cabot, MD as PCP - General (Family Medicine)  Bonne Cabot, MD as PCP - [address] [place] [address] Provider  Lv Landry MD as Consulting Physician (Nephrology)  Ninoska Kirk MD as Consulting Physician (Urology)  Scott Ellison DPM as Consulting Physician (Podiatry)  Suresh Santos MD as Consulting Physician (Cardiology)  Shana Paul MD as Consulting Physician (Internal Medicine Cardiovascular Disease)  Malena Whiteside MD as Consulting Physician (Cardiology)  Yokasta Garcia MD as Consulting Physician (Internal Medicine)  Jeremy Ordonez MD as Surgeon (Vascular Surgery)  Paulie Farrar MD as Surgeon (Internal Medicine Cardiovascular Disease)  Veronica Shafer as Consulting Physician (Thoracic Surgery)    Medical History Review  Past Medical, Family, and Social History reviewed and does not contribute to the patient presenting condition    Health Maintenance   Topic Date Due    Annual Wellness Visit (AWV)  05/29/2019    Shingles Vaccine (1 of 2) 10/21/2020 (Originally 5/14/1990)    DTaP/Tdap/Td vaccine (1 - Tdap) 11/18/2021 (Originally 5/14/1959)    Flu vaccine (1) 09/01/2020    Diabetic retinal exam  09/27/2020    Diabetic foot exam  10/10/2020    A1C test (Diabetic or Prediabetic)  01/02/2021    Lipid screen  07/02/2021    Potassium monitoring  07/02/2021    Creatinine monitoring  07/02/2021    Pneumococcal 65+ yrs at Risk Vaccine  Completed    Hepatitis A vaccine  Aged Out    Hib vaccine  Aged Out    Meningococcal (ACWY) vaccine  Aged Out     Patient/Caregiver verbalize understanding of medications.   Yes

## 2020-09-01 ENCOUNTER — OFFICE VISIT (OUTPATIENT)
Dept: FAMILY MEDICINE CLINIC | Age: 80
End: 2020-09-01
Payer: COMMERCIAL

## 2020-09-01 VITALS
HEART RATE: 72 BPM | RESPIRATION RATE: 18 BRPM | DIASTOLIC BLOOD PRESSURE: 84 MMHG | TEMPERATURE: 97.8 F | SYSTOLIC BLOOD PRESSURE: 136 MMHG | BODY MASS INDEX: 33.58 KG/M2 | OXYGEN SATURATION: 96 % | WEIGHT: 234 LBS

## 2020-09-01 PROCEDURE — 81003 URINALYSIS AUTO W/O SCOPE: CPT | Performed by: FAMILY MEDICINE

## 2020-09-01 PROCEDURE — 90694 VACC AIIV4 NO PRSRV 0.5ML IM: CPT | Performed by: FAMILY MEDICINE

## 2020-09-01 PROCEDURE — G0008 ADMIN INFLUENZA VIRUS VAC: HCPCS | Performed by: FAMILY MEDICINE

## 2020-09-01 PROCEDURE — 3051F HG A1C>EQUAL 7.0%<8.0%: CPT | Performed by: FAMILY MEDICINE

## 2020-09-01 PROCEDURE — 99214 OFFICE O/P EST MOD 30 MIN: CPT | Performed by: FAMILY MEDICINE

## 2020-09-01 RX ORDER — DONEPEZIL HYDROCHLORIDE 10 MG/1
10 TABLET, FILM COATED ORAL NIGHTLY
Qty: 90 TABLET | Refills: 11 | Status: SHIPPED
Start: 2020-09-01 | End: 2020-11-05 | Stop reason: ALTCHOICE

## 2020-09-01 ASSESSMENT — ENCOUNTER SYMPTOMS
NAUSEA: 0
DIARRHEA: 0
ABDOMINAL PAIN: 0
CONSTIPATION: 0
SORE THROAT: 0
SHORTNESS OF BREATH: 0
RHINORRHEA: 0
VOMITING: 0
BACK PAIN: 0
ROS SKIN COMMENTS: AT TIMES
ABDOMINAL DISTENTION: 0
CHEST TIGHTNESS: 0
COUGH: 0

## 2020-09-01 NOTE — PROGRESS NOTES
Visit Information    Have you changed or started any medications since your last visit including any over-the-counter medicines, vitamins, or herbal medicines? no   Have you stopped taking any of your medications? Is so, why? -  no  Are you having any side effects from any of your medications? - no    Have you seen any other physician or provider since your last visit?  no   Have you had any other diagnostic tests since your last visit?  no   Have you been seen in the emergency room and/or had an admission in a hospital since we last saw you?  no   Have you had your routine dental cleaning in the past 6 months?  no     Do you have an active MyChart account? If no, what is the barrier?   Yes    Patient Care Team:  Fatmata Parker MD as PCP - General (Family Medicine)  Fatmata Parker MD as PCP - [facility]  Liseth Panchal MD as Consulting Physician (Nephrology)  Erica Wiseman MD as Consulting Physician (Urology)  Kentrell Lundberg DPM as Consulting Physician (Podiatry)  Lashell Elliott MD as Consulting Physician (Cardiology)  Brandon Barclay MD as Consulting Physician (Internal Medicine Cardiovascular Disease)  Parisa Chairez MD as Consulting Physician (Cardiology)  Robby Dakins, MD as Consulting Physician (Internal Medicine)  Clinton Ballesteros MD as Surgeon (Vascular Surgery)  Trenton Borjas MD as Surgeon (Internal Medicine Cardiovascular Disease)  Sudhir Sow as Consulting Physician (Thoracic Surgery)    Medical History Review  Past Medical, Family, and Social History reviewed and does not contribute to the patient presenting condition    Health Maintenance   Topic Date Due    Annual Wellness Visit (AWV)  05/29/2019    Flu vaccine (1) 09/01/2020    Shingles Vaccine (1 of 2) 10/21/2020 (Originally [birth year]/14/1988)    DTaP/Tdap/Td vaccine (1 - Tdap) 11/18/2021 (Originally 5/14/1959)    Diabetic retinal exam  09/27/2020    Diabetic foot exam  10/10/2020    A1C test (Diabetic or Prediabetic)  01/02/2021    Lipid screen  07/02/2021    Potassium monitoring  07/02/2021    Creatinine monitoring  07/02/2021    Pneumococcal 65+ yrs at Risk Vaccine  Completed    Hepatitis A vaccine  Aged Out    Hib vaccine  Aged Out    Meningococcal (ACWY) vaccine  Aged Out     Patient/Caregiver verbalize understanding of medications. Yes    Vaccine Information Sheet, \"Influenza - Inactivated\"  given to Brandon Nava, or parent/legal guardian of  Brandon Nava and verbalized understanding. Patient responses:    Have you ever had a reaction to a flu vaccine? No  Are you able to eat eggs without adverse effects? Yes  Do you have any current illness? No  Have you ever had Guillian Fife Syndrome? No    Flu vaccine given per order. Please see immunization tab.

## 2020-09-01 NOTE — PROGRESS NOTES
Kelsey Chen MD  [facility]  [address] [address]  [address] [address]  Dept: [phone]  Dept Fax: [phone]     Patient ID: Kaylah Gaxiola is a 78 y.o. male. HPI    Pt here today for follow on memory loss. His daughter-in-law has noticed maybe a slight improvement, but not any worse with his memory. He is tolerating the medicine with no side effects. Pt denies any fever or chills. Pt today denies any HA, chest pain, or SOB. Pt denies any N/V/D/C or abdominal pain. Pt didn't get labs prior to this visit. Otherwise pt doing well on current tx and no other concerns today. The patient's past medical, surgical, social, and family history as well as his current medications and allergies were reviewed as documented in today's encounter. Current Outpatient Medications on File Prior to Visit   Medication Sig Dispense Refill    Insulin Degludec 100 UNIT/ML SOLN Inject 26 Units into the skin daily 60 mL 11    Tiotropium Bromide-Olodaterol (STIOLTO RESPIMAT) 2.5-2.5 MCG/ACT AERS 2 puffs daily 3 Inhaler 0    metoprolol succinate (TOPROL XL) 25 MG extended release tablet Take 0.5 tablets by mouth daily 45 tablet 3    allopurinol (ZYLOPRIM) 100 MG tablet TAKE 2 TABLETS DAILY 180 tablet 3    furosemide (LASIX) 40 MG tablet Take 1 tablet by mouth daily 90 tablet 3    Lancets Thin MISC 1 Device by Does not apply route daily 90 each 3    atorvastatin (LIPITOR) 20 MG tablet TAKE 1 TABLET BY MOUTH ONE TIME A DAY  90 tablet 3    pantoprazole (PROTONIX) 40 MG tablet Take 40 mg by mouth daily      polyethylene glycol (GLYCOLAX) powder Take 17 g by mouth daily as needed      warfarin (COUMADIN) 4 MG tablet Take 4 mg by mouth Per Jobst      aspirin 81 MG EC tablet Take 81 mg by mouth daily      acetaminophen (TYLENOL) 500 MG tablet Take 500 mg by mouth       No current facility-administered medications on file prior to visit. Subjective:     Review of Systems   Constitutional: Negative for appetite change, fatigue and fever. HENT: Negative for congestion, ear pain, rhinorrhea and sore throat. Respiratory: Negative for cough, chest tightness and shortness of breath. Cardiovascular: Negative for chest pain and palpitations. Gastrointestinal: Negative for abdominal distention, abdominal pain, constipation, diarrhea, nausea and vomiting. Genitourinary: Negative for difficulty urinating and dysuria. Musculoskeletal: Negative for arthralgias, back pain and myalgias. Skin: Negative for rash. At times   Neurological: Negative for dizziness, weakness, light-headedness and headaches. Hematological: Negative for adenopathy. Psychiatric/Behavioral: Positive for confusion (mild). Negative for behavioral problems and sleep disturbance. The patient is not nervous/anxious. Memory loss       Objective:     Physical Exam  Vitals signs reviewed. Constitutional:       General: He is not in acute distress. Appearance: He is well-developed. HENT:      Head: Normocephalic and atraumatic. Right Ear: External ear normal.      Left Ear: External ear normal.      Nose: Nose normal.      Mouth/Throat:      Pharynx: No oropharyngeal exudate. Eyes:      Conjunctiva/sclera: Conjunctivae normal.      Pupils: Pupils are equal, round, and reactive to light. Neck:      Musculoskeletal: Normal range of motion. Cardiovascular:      Rate and Rhythm: Normal rate and regular rhythm. Heart sounds: Normal heart sounds. No murmur. Pulmonary:      Effort: Pulmonary effort is normal. No respiratory distress. Breath sounds: Normal breath sounds. No wheezing. Chest:      Chest wall: No tenderness. Abdominal:      General: Bowel sounds are normal. There is no distension. Palpations: Abdomen is soft. There is no mass. Tenderness: There is no abdominal tenderness. Musculoskeletal: Normal range of motion.

## 2020-10-20 ENCOUNTER — OFFICE VISIT (OUTPATIENT)
Dept: PODIATRY | Age: 80
End: 2020-10-20
Payer: COMMERCIAL

## 2020-10-20 VITALS — TEMPERATURE: 97.3 F | BODY MASS INDEX: 33.5 KG/M2 | HEIGHT: 70 IN | WEIGHT: 234 LBS | RESPIRATION RATE: 16 BRPM

## 2020-10-20 PROCEDURE — 99203 OFFICE O/P NEW LOW 30 MIN: CPT | Performed by: PODIATRIST

## 2020-10-20 PROCEDURE — 11721 DEBRIDE NAIL 6 OR MORE: CPT | Performed by: PODIATRIST

## 2020-10-20 PROCEDURE — 3051F HG A1C>EQUAL 7.0%<8.0%: CPT | Performed by: PODIATRIST

## 2020-10-20 RX ORDER — AMMONIUM LACTATE 12 G/100G
CREAM TOPICAL
Qty: 1 BOTTLE | Refills: 4 | Status: SHIPPED | OUTPATIENT
Start: 2020-10-20

## 2020-10-20 NOTE — PROGRESS NOTES
Hyperlipidemia     Hypertension     Iron deficiency anemia     Nonrheumatic aortic valve stenosis 6/6/2018    Paroxysmal atrial fibrillation ([facility] [address] 6/6/2018    Renal insufficiency     Sleep apnea     Status post placement of cardiac pacemaker 6/6/2018    Type II or unspecified type diabetes mellitus without mention of complication, not stated as uncontrolled        Allergies   Allergen Reactions    Pcn [Penicillins]      burning  Hot/redness at injection site. Current Outpatient Medications on File Prior to Visit   Medication Sig Dispense Refill    donepezil (ARICEPT) 10 MG tablet Take 1 tablet by mouth nightly 90 tablet 11    Insulin Degludec 100 UNIT/ML SOLN Inject 26 Units into the skin daily 60 mL 11    Tiotropium Bromide-Olodaterol (STIOLTO RESPIMAT) 2.5-2.5 MCG/ACT AERS 2 puffs daily 3 Inhaler 0    metoprolol succinate (TOPROL XL) 25 MG extended release tablet Take 0.5 tablets by mouth daily 45 tablet 3    allopurinol (ZYLOPRIM) 100 MG tablet TAKE 2 TABLETS DAILY 180 tablet 3    furosemide (LASIX) 40 MG tablet Take 1 tablet by mouth daily 90 tablet 3    Lancets Thin MISC 1 Device by Does not apply route daily 90 each 3    atorvastatin (LIPITOR) 20 MG tablet TAKE 1 TABLET BY MOUTH ONE TIME A DAY  90 tablet 3    pantoprazole (PROTONIX) 40 MG tablet Take 40 mg by mouth daily      polyethylene glycol (GLYCOLAX) powder Take 17 g by mouth daily as needed      warfarin (COUMADIN) 4 MG tablet Take 4 mg by mouth Per Jobst      aspirin 81 MG EC tablet Take 81 mg by mouth daily      acetaminophen (TYLENOL) 500 MG tablet Take 500 mg by mouth       No current facility-administered medications on file prior to visit.         Review of Systems    Review of Systems:   History obtained from chart review and the patient  General ROS: negative for - chills, fatigue, fever, night sweats or weight gain  Constitutional: Negative for chills, diaphoresis, fatigue, fever and unexpected weight change. Musculoskeletal: Positive for arthralgias, gait problem and joint swelling. Neurological ROS: negative for - behavioral changes, confusion, headaches or seizures. Negative for weakness and numbness. Dermatological ROS: negative for - mole changes, rash  Cardiovascular: Negative for leg swelling. Gastrointestinal: Negative for constipation, diarrhea, nausea and vomiting. Objective:  Dermatologic Exam:   Dry scaly skin noted to the plantar surface of the right and left foot. Small superficial fissuring of the skin noted to the plantar heel bilateral    Skin is thin, with flaky sloughing skin as well as decreased hair growth to the lower leg  Small red hemosiderin deposits seen dorsal foot   Musculoskeletal:     1st MPJ ROM decreased, Bilateral.  Muscle strength 5/5, Bilateral.  Pain present upon palpation of toenails 1-5, Bilateral. decreased medial longitudinal arch, Bilateral.  Ankle ROM decreased,Bilateral.    Dorsally contracted digits present digits 2, Bilateral.     Vascular: DP pulses 1/4 bilateral.  PT pulses 0/4 bilateral.   CFT <5 seconds, Bilateral.  Hair growth absent to the level of the digits, Bilateral.  Edema present, Bilateral.  Varicosities absent, Bilateral. Erythema absent, Bilateral    Neurological: Sensation diminshed to light touch to level of digits, Bilateral.  Protective sensation intact 6/10 sites via 5.07/10g Tecumseh-Ravi Monofilament, Bilateral.  negative Tinel's, Bilateral.  negative Valleix sign, Bilateral.      Integument: Warm, dry, supple, Bilateral.  Open lesion absent, Bilateral.  Interdigital maceration absent to web spaces 4, Bilateral.  Nails 1-5 left and 1-5 right thickened > 3.0 mm, dystrophic and crumbly, discolored with subungual debris. Fissures absent, Bilateral.   General: AAO x 3 in NAD.     Derm  Toenail Description  Sites of Onychomycosis Involvement (Check affected area)  [x] [x] [x] [x] [x] [x] [x] [x] [x] [x]  5 4 3 2 1 1 2 3 4 5 Right                                        Left    Thickness  [x] [x] [x] [x] [x] [x] [x] [x] [x] [x]  5 4 3 2 1 1 2 3 4 5                         Right                                        Left    Dystrophic Changes   [x] [x] [x] [x] [x] [x] [x] [x] [x] [x]  5 4 3 2 1 1 2 3 4 5                         Right                                        Left    Color   [x] [x] [x] [x] [x] [x] [x] [x] [x] [x]  5 4 3 2 1 1 2 3 4 5                          Right                                        Left    Incurvation/Ingrowin   [] [] [] [] [] [] [] [] [] []  5 4 3 2 1 1 2 3 4 5                         Right                                        Left    Inflammation/Pain   [x] [x] [x] [x] [x] [x] [x] [x] [x] [x]  5 4 3 2 1 1 2 3 4 5                         Right                                        Left        Visual inspection:  Deformity: hammertoe deformity brock feet  amputation: absent  Skin lesions: present - as above  Edema: right- 2+ pitting edema, left- 2+ pitting edema    Sensory exam:  Monofilament sensation: abnormal - 6/10 via SW 5.07/10g monofilament to the plantar foot bilateral feet    Pulses: abnormal - 1/4 dorsalis pedis pulse and 1/4 Posterior tibial pulse,   Pinprick: Impaired  Proprioception: Impaired  Vibration (128 Hz): Impaired       DM with PVD       [x]Yes    []No      Assessment:  78 y.o. male with:   Diagnosis Orders   1. Dermatophytosis of nail   DIABETES FOOT EXAM    21421 - [place] DEBRIDEMENT OF NAILS, 6 OR MORE   2. Type 2 diabetes mellitus with peripheral vascular disease (HCC)   DIABETES FOOT EXAM    01934 - [place] DEBRIDEMENT OF NAILS, 6 OR MORE    VL LOWER EXTREMITY ARTERIAL SEGMENTAL PRESSURES W PPG   3. Bilateral foot pain  HM DIABETES FOOT EXAM    53455 - [place] DEBRIDEMENT OF NAILS, 6 OR MORE    VL LOWER EXTREMITY ARTERIAL SEGMENTAL PRESSURES W PPG   4.  Peripheral vascular disorder (HCC)   DIABETES FOOT EXAM    16636 - [place] DEBRIDEMENT OF NAILS, 6 OR MORE    VL LOWER EXTREMITY ARTERIAL SEGMENTAL PRESSURES W PPG           Q7   []Yes    []No                Q8   [x]Yes    []No                     Q9   []Yes    []No    Plan:   Pt was evaluated and examined. Patient was given personalized discharge instructions. I am ordering non invasive vasuclar studies the patients lower legs are extremely cold and discolored with cyanosis. The patient has decreased hair growth to the lower extremites with a delayed capillary refill time. This test is necessary to see if vascular surgery involvement is necessary and would relieve patient of their painful symptoms. For patients xerosis of skin pt to apply rx Lac hydrin 12% to the area to be applied daily. Pt to use a pummuce stone to the plantar surface of the feet weekly     Nails 1-10 were debrided sharply in length and thickness with a nipper and , without incident. Pt will follow up in 9 weeks or sooner if any problems arise. Diagnosis was discussed with the pt and all of their questions were answered in detail. Proper foot hygiene and care was discussed with the pt. Informed patient on proper diabetic foot care and importance of tight glycemic control. Patient to check feet daily and contact the office with any questions/problems/concerns. Other comorbidity noted and will be managed by PCP.   10/20/2020    Electronically signed by Tiarra Seth DPM on 10/20/2020 at 10:46 AM  10/20/2020

## 2020-10-23 ENCOUNTER — TELEPHONE (OUTPATIENT)
Dept: FAMILY MEDICINE CLINIC | Age: 80
End: 2020-10-23

## 2020-10-23 NOTE — TELEPHONE ENCOUNTER
Left a detailed message for his daughter, Maxi Lobato, stating what  said. Told her to call back, if she had any questions.

## 2020-10-23 NOTE — TELEPHONE ENCOUNTER
Wondering if patient takes a over the counter sinus medication, if that would interfere with any medications he is currently taking. Symptoms:  Runny nose   Congestion     He normally has these symptoms, but his daughter is in town and feels he needs to take some sort of sinus medication. Wondering what over the counter medication PCP would recommend for this? Please advise. Luz Champagne did state that if she doesn't answer the phone when we call back, to just leave a detailed message.

## 2020-11-03 LAB
ALBUMIN SERPL-MCNC: 3.4 G/DL
ALP BLD-CCNC: 59 U/L
ALT SERPL-CCNC: 18 U/L
ANION GAP SERPL CALCULATED.3IONS-SCNC: 14 MMOL/L
AST SERPL-CCNC: 31 U/L
AVERAGE GLUCOSE: 160
BASOPHILS ABSOLUTE: ABNORMAL
BASOPHILS RELATIVE PERCENT: ABNORMAL
BILIRUB SERPL-MCNC: 1.8 MG/DL (ref 0.1–1.4)
BILIRUBIN, URINE: NEGATIVE
BLOOD, URINE: NEGATIVE
BUN BLDV-MCNC: 36 MG/DL
CALCIUM SERPL-MCNC: 9.1 MG/DL
CHLORIDE BLD-SCNC: 101 MMOL/L
CLARITY: CLEAR
CO2: 29 MMOL/L
COLOR: YELLOW
CREAT SERPL-MCNC: 1.71 MG/DL
EOSINOPHILS ABSOLUTE: ABNORMAL
EOSINOPHILS RELATIVE PERCENT: ABNORMAL
GFR CALCULATED: 39
GLUCOSE BLD-MCNC: 102 MG/DL
GLUCOSE URINE: ABNORMAL
HBA1C MFR BLD: 7.2 %
HCT VFR BLD CALC: 36.2 % (ref 41–53)
HEMOGLOBIN: 11.7 G/DL (ref 13.5–17.5)
KETONES, URINE: NEGATIVE
LEUKOCYTE ESTERASE, URINE: NEGATIVE
LYMPHOCYTES ABSOLUTE: ABNORMAL
LYMPHOCYTES RELATIVE PERCENT: ABNORMAL
MCH RBC QN AUTO: ABNORMAL PG
MCHC RBC AUTO-ENTMCNC: ABNORMAL G/DL
MCV RBC AUTO: ABNORMAL FL
MONOCYTES ABSOLUTE: ABNORMAL
MONOCYTES RELATIVE PERCENT: ABNORMAL
NEUTROPHILS ABSOLUTE: ABNORMAL
NEUTROPHILS RELATIVE PERCENT: ABNORMAL
NITRITE, URINE: NEGATIVE
PH UA: 6 (ref 4.5–8)
PLATELET # BLD: 160 K/ΜL
PMV BLD AUTO: ABNORMAL FL
POTASSIUM SERPL-SCNC: 4.2 MMOL/L
PROTEIN UA: POSITIVE
RBC # BLD: ABNORMAL 10*6/UL
SODIUM BLD-SCNC: 144 MMOL/L
SPECIFIC GRAVITY, URINE: 1.02
T4 FREE: 1.42
TOTAL PROTEIN: 7.1
TSH SERPL DL<=0.05 MIU/L-ACNC: 1.25 UIU/ML
URIC ACID: 6.9
UROBILINOGEN, URINE: ABNORMAL
VITAMIN B-12: 682
WBC # BLD: 9.7 10^3/ML

## 2020-11-03 PROCEDURE — 81003 URINALYSIS AUTO W/O SCOPE: CPT | Performed by: FAMILY MEDICINE

## 2020-11-05 ENCOUNTER — OFFICE VISIT (OUTPATIENT)
Dept: FAMILY MEDICINE CLINIC | Age: 80
End: 2020-11-05
Payer: COMMERCIAL

## 2020-11-05 VITALS
OXYGEN SATURATION: 95 % | TEMPERATURE: 98.4 F | BODY MASS INDEX: 34.15 KG/M2 | HEART RATE: 68 BPM | RESPIRATION RATE: 18 BRPM | DIASTOLIC BLOOD PRESSURE: 72 MMHG | SYSTOLIC BLOOD PRESSURE: 110 MMHG | WEIGHT: 238 LBS

## 2020-11-05 PROBLEM — R41.89 COGNITIVE DECLINE: Status: ACTIVE | Noted: 2020-11-05

## 2020-11-05 PROCEDURE — 3051F HG A1C>EQUAL 7.0%<8.0%: CPT | Performed by: FAMILY MEDICINE

## 2020-11-05 PROCEDURE — 99215 OFFICE O/P EST HI 40 MIN: CPT | Performed by: FAMILY MEDICINE

## 2020-11-05 RX ORDER — CALCIUM CITRATE/VITAMIN D3 200MG-6.25
1 TABLET ORAL DAILY
Qty: 100 EACH | Refills: 11 | Status: SHIPPED | OUTPATIENT
Start: 2020-11-05

## 2020-11-05 RX ORDER — MEMANTINE HYDROCHLORIDE 5 MG-10 MG
KIT ORAL
Qty: 1 PACKAGE | Refills: 0 | Status: SHIPPED | OUTPATIENT
Start: 2020-11-05 | End: 2020-12-07 | Stop reason: SDUPTHER

## 2020-11-05 RX ORDER — NYSTATIN 100000 [USP'U]/G
POWDER TOPICAL
Qty: 1 BOTTLE | Refills: 1 | Status: SHIPPED | OUTPATIENT
Start: 2020-11-05 | End: 2020-11-09 | Stop reason: SDUPTHER

## 2020-11-05 RX ORDER — LANCETS
1 EACH MISCELLANEOUS DAILY
Qty: 90 EACH | Refills: 3 | Status: SHIPPED | OUTPATIENT
Start: 2020-11-05

## 2020-11-05 ASSESSMENT — ENCOUNTER SYMPTOMS
BACK PAIN: 0
DIARRHEA: 0
COUGH: 1
SHORTNESS OF BREATH: 1
CONSTIPATION: 0
RHINORRHEA: 0
ABDOMINAL DISTENTION: 0
SORE THROAT: 0
NAUSEA: 0
VOMITING: 0
ABDOMINAL PAIN: 0
CHEST TIGHTNESS: 0

## 2020-11-05 NOTE — PROGRESS NOTES
Kelsey Judd MD    [address] [place] [address]  [id number]  [address] [address]  Dept: [phone]  Dept Fax: [phone]     Patient ID: Domonique Malhotra is a 78 y.o. male. HPI    Pt here today for f/u on chronic medical problems HTN, HLD, DM, CKD, COPD, CHF, CAD, Anemia, Gout, Atrial Fibrillation, ,go over labs and/or diagnostic studies, and medication refills. Pt denies any fever or chills. Pt today denies any HA, chest pain, but does get SOB and has a chronic cough. Pt denies any N/V/D/C or abdominal pain. Pt denies any gouty attacks. His daughter-in-law states the Aricept is not working very well and his memory is not improved. He is forgetful and has been doing odd things and just not himself. He gets very agitated. His BS's have sometimes been down in the 40's in the morning. He does have a rash under his left breast.     Otherwise pt doing well on current tx and no other concerns today. The patient's past medical, surgical, social, and family history as well as his current medications and allergies were reviewed as documented in today's encounter. Current Outpatient Medications on File Prior to Visit   Medication Sig Dispense Refill    ammonium lactate (LAC-HYDRIN) 12 % cream Apply topically as needed.  1 Bottle 4    Insulin Degludec 100 UNIT/ML SOLN Inject 26 Units into the skin daily 60 mL 11    Tiotropium Bromide-Olodaterol (STIOLTO RESPIMAT) 2.5-2.5 MCG/ACT AERS 2 puffs daily 3 Inhaler 0    metoprolol succinate (TOPROL XL) 25 MG extended release tablet Take 0.5 tablets by mouth daily 45 tablet 3    allopurinol (ZYLOPRIM) 100 MG tablet TAKE 2 TABLETS DAILY 180 tablet 3    furosemide (LASIX) 40 MG tablet Take 1 tablet by mouth daily 90 tablet 3    atorvastatin (LIPITOR) 20 MG tablet TAKE 1 TABLET BY MOUTH ONE TIME A DAY  90 tablet 3    pantoprazole (PROTONIX) 40 MG tablet Take 40 mg by mouth daily      polyethylene glycol (GLYCOLAX) powder Take 17 g by mouth daily as needed      warfarin (COUMADIN) 4 MG tablet Take 4 mg by mouth Per Jobst      aspirin 81 MG EC tablet Take 81 mg by mouth daily      acetaminophen (TYLENOL) 500 MG tablet Take 500 mg by mouth       No current facility-administered medications on file prior to visit. Subjective:     Review of Systems   Constitutional: Negative for appetite change, fatigue and fever. HENT: Negative for congestion, ear pain, rhinorrhea and sore throat. Respiratory: Positive for cough and shortness of breath. Negative for chest tightness. Cardiovascular: Negative for chest pain and palpitations. Gastrointestinal: Negative for abdominal distention, abdominal pain, constipation, diarrhea, nausea and vomiting. Genitourinary: Negative for difficulty urinating and dysuria. Musculoskeletal: Negative for arthralgias, back pain and myalgias. Skin: Positive for rash (under left breast). Neurological: Negative for dizziness, weakness, light-headedness and headaches. Hematological: Negative for adenopathy. Psychiatric/Behavioral: Positive for confusion (and memory loss). Negative for behavioral problems and sleep disturbance. The patient is not nervous/anxious. Memory loss       Objective:     Physical Exam  Vitals signs reviewed. Constitutional:       General: He is not in acute distress. Appearance: He is well-developed. HENT:      Head: Normocephalic and atraumatic. Right Ear: External ear normal.      Left Ear: External ear normal.      Nose: Nose normal.      Mouth/Throat:      Pharynx: No oropharyngeal exudate. Eyes:      Conjunctiva/sclera: Conjunctivae normal.      Pupils: Pupils are equal, round, and reactive to light. Neck:      Musculoskeletal: Normal range of motion. Cardiovascular:      Rate and Rhythm: Normal rate and regular rhythm. Heart sounds: Normal heart sounds. No murmur.    Pulmonary:      Effort: Pulmonary Plan:     Orders Placed This Encounter   Procedures    XR CHEST (2 VW)     Standing Status:   Future     Standing Expiration Date:   2020     Order Specific Question:   Reason for exam:     Answer:   Cough    External Referral To Home Health     Referral Priority:   Routine     Referral Type:   Home Health Care     Referral Reason:   Specialty Services Required     Requested Specialty:   [address]     Number of Visits Requested:   1      Orders Placed This Encounter   Medications    memantine (NAMENDA TITRATION TYSHAWN) 28 x 5 MG & 21 x 10 MG tablet pack     Si mg/day for =1 week; 5 mg twice daily for =1 week; 15 mg/day given in 5 mg and 10 mg  doses for =1 week; then 10 mg twice daily     Dispense:  1 Package     Refill:  0    nystatin (MYCOSTATIN) [id number] UNIT/GM powder     Sig: Apply 3 times daily.      Dispense:  1 Bottle     Refill:  1    Lancets Thin MISC     Si Device by Does not apply route daily     Dispense:  90 each     Refill:  3    blood glucose test strips (TRUE METRIX BLOOD GLUCOSE TEST) strip     Si each by In Vitro route daily Testing 1 time daily     Dispense:  100 each     Refill:  11    Budeson-Glycopyrrol-Formoterol 160-9-4.8 MCG/ACT AERO     Sig: Two puffs BID     Dispense:  1 Inhaler     Refill:  3     Continue with ADA diet, current diabetic meds, and monitoring BS's as instructed, but I am going to move the [facility] to take in the morning and will cont with 26U and will call with BS's in 2 weeks and to eat a protein snack at night    No Ace-I secondary to CKD    Cont with daily foot exams and yearly eye exams and he is following with Podiatry for foot checks and toenail clipping    Will cont with low fat/chol diet and Lipitor as ordered    Will stop the Aricept and will start the Namenda starter pack as ordered and back in a month to see how he is doing    Will cont with the Stiolto Respimat and I am going to start the [facility] and take 2 puffs BID

## 2020-11-05 NOTE — PROGRESS NOTES
Visit Information    Have you changed or started any medications since your last visit including any over-the-counter medicines, vitamins, or herbal medicines? no   Have you stopped taking any of your medications? Is so, why? -  no  Are you having any side effects from any of your medications? - no    Have you seen any other physician or provider since your last visit? yes - cardiology   Have you had any other diagnostic tests since your last visit? yes - labs, arterial dopplers   Have you been seen in the emergency room and/or had an admission in a hospital since we last saw you?  no   Have you had your routine dental cleaning in the past 6 months?  no     Do you have an active MyChart account? If no, what is the barrier?   Yes    Patient Care Team:  Cem Roberto MD as PCP - General (Family Medicine)  Cem Roberto MD as PCP - [facility]  Blanca Ramirez MD as Consulting Physician (Nephrology)  Armando Carranza MD as Consulting Physician (Urology)  Quinn Mello DPM as Consulting Physician (Podiatry)  Stephanie Casillas MD as Consulting Physician (Cardiology)  Altagracia Molina MD as Consulting Physician (Internal Medicine Cardiovascular Disease)  Deyanira Eagle MD as Consulting Physician (Cardiology)  Dickson Renae MD as Consulting Physician (Internal Medicine)  Caitlin Henry MD as Surgeon (Vascular Surgery)  Fior Renee MD as Surgeon (Internal Medicine Cardiovascular Disease)  Idania Robert as Consulting Physician (Thoracic Surgery)  Clari Buchanan DPM as Physician (Podiatry)    Medical History Review  Past Medical, Family, and Social History reviewed and does contribute to the patient presenting condition    Health Maintenance   Topic Date Due    Shingles Vaccine (1 of 2) 05/14/1988    Annual Wellness Visit (AWV)  05/29/2019    Diabetic retinal exam  09/27/2020    DTaP/Tdap/Td vaccine (1 - Tdap) 11/18/2021 (Originally 5/14/1959)    A1C test (Diabetic or

## 2020-11-09 ENCOUNTER — TELEPHONE (OUTPATIENT)
Dept: FAMILY MEDICINE CLINIC | Age: 80
End: 2020-11-09

## 2020-11-09 RX ORDER — NYSTATIN 100000 [USP'U]/G
POWDER TOPICAL
Qty: 1 BOTTLE | Refills: 1 | Status: SHIPPED | OUTPATIENT
Start: 2020-11-09 | End: 2020-12-08 | Stop reason: SDUPTHER

## 2020-11-09 NOTE — TELEPHONE ENCOUNTER
Can you send for the needles. Will try Trilegy Ellipta 1 puff daily and if he does get this inhaler have him stop the [employer].

## 2020-11-09 NOTE — TELEPHONE ENCOUNTER
Can you pleas clarify all of this??? The powder was sent over and the one inhaler I sent over was too expensive and so I sent over the Trelegy? ?

## 2020-11-09 NOTE — TELEPHONE ENCOUNTER
Patient was supposed to start a new inhaler, but insurance will not cover the new brand name one. Is their a different one that can be sent in? Patient also needs his needles sent in.    Jeevan Andrew would like a call back regarding. Please advise.

## 2020-11-09 NOTE — TELEPHONE ENCOUNTER
candace called from [address] will starting viists for 1 weekx4 and PT AND OC will be coming in for eval. Mralene wanted inform the physician that patient is taking warfarin and Allopurinol which can cause an interaction.

## 2020-11-09 NOTE — TELEPHONE ENCOUNTER
Patient's daughter-in law called stating they don't need a new inhaler, but needs lancets and medicated Powder.

## 2020-11-10 RX ORDER — TIOTROPIUM BROMIDE AND OLODATEROL 3.124; 2.736 UG/1; UG/1
SPRAY, METERED RESPIRATORY (INHALATION)
Qty: 3 INHALER | Refills: 3 | Status: SHIPPED | OUTPATIENT
Start: 2020-11-10

## 2020-11-16 RX ORDER — ATORVASTATIN CALCIUM 20 MG/1
TABLET, FILM COATED ORAL
Qty: 90 TABLET | Refills: 1 | Status: SHIPPED | OUTPATIENT
Start: 2020-11-16 | End: 2020-11-23

## 2020-11-17 ENCOUNTER — TELEPHONE (OUTPATIENT)
Dept: FAMILY MEDICINE CLINIC | Age: 80
End: 2020-11-17

## 2020-11-23 RX ORDER — ATORVASTATIN CALCIUM 20 MG/1
TABLET, FILM COATED ORAL
Qty: 90 TABLET | Refills: 3 | Status: SHIPPED | OUTPATIENT
Start: 2020-11-23

## 2020-12-07 RX ORDER — MEMANTINE HYDROCHLORIDE 5 MG-10 MG
KIT ORAL
Qty: 1 PACKAGE | Refills: 3 | Status: SHIPPED | OUTPATIENT
Start: 2020-12-07 | End: 2020-12-08 | Stop reason: ALTCHOICE

## 2020-12-08 ENCOUNTER — OFFICE VISIT (OUTPATIENT)
Dept: FAMILY MEDICINE CLINIC | Age: 80
End: 2020-12-08
Payer: COMMERCIAL

## 2020-12-08 VITALS
OXYGEN SATURATION: 94 % | RESPIRATION RATE: 18 BRPM | WEIGHT: 242 LBS | BODY MASS INDEX: 34.72 KG/M2 | TEMPERATURE: 99 F | HEART RATE: 72 BPM | DIASTOLIC BLOOD PRESSURE: 74 MMHG | SYSTOLIC BLOOD PRESSURE: 122 MMHG

## 2020-12-08 PROBLEM — F03.918 DEMENTIA WITH BEHAVIORAL DISTURBANCE: Status: ACTIVE | Noted: 2020-12-08

## 2020-12-08 PROCEDURE — 99214 OFFICE O/P EST MOD 30 MIN: CPT | Performed by: FAMILY MEDICINE

## 2020-12-08 PROCEDURE — 3051F HG A1C>EQUAL 7.0%<8.0%: CPT | Performed by: FAMILY MEDICINE

## 2020-12-08 RX ORDER — MEMANTINE HYDROCHLORIDE 10 MG/1
10 TABLET ORAL 2 TIMES DAILY
Qty: 60 TABLET | Refills: 11 | Status: SHIPPED | OUTPATIENT
Start: 2020-12-08

## 2020-12-08 RX ORDER — NYSTATIN 100000 [USP'U]/G
POWDER TOPICAL
Qty: 1 BOTTLE | Refills: 1 | Status: SHIPPED | OUTPATIENT
Start: 2020-12-08

## 2020-12-08 RX ORDER — MEMANTINE HYDROCHLORIDE 10 MG/1
10 TABLET ORAL 2 TIMES DAILY
COMMUNITY
End: 2020-12-08 | Stop reason: SDUPTHER

## 2020-12-08 ASSESSMENT — ENCOUNTER SYMPTOMS
ABDOMINAL PAIN: 0
VOMITING: 0
CONSTIPATION: 0
RHINORRHEA: 0
NAUSEA: 0
DIARRHEA: 0
ABDOMINAL DISTENTION: 0
SHORTNESS OF BREATH: 0
CHEST TIGHTNESS: 0
BACK PAIN: 0
COUGH: 0
SORE THROAT: 0

## 2020-12-08 NOTE — PROGRESS NOTES
Kelsey Perrin MD  [address] [place] [address] [facility]  [id number]  [address] [address]  Dept: [phone]  Dept Fax: [phone]     Patient ID: Kiel Grajeda is a 78 y.o. male. HPI    Pt here today for follow up on dementia after starting the Namenda. We took him off the Aricept because they didn't see much difference and we started the Namenda. His daughter-in-law states they haven't seen much improvement with his memory and is still very forgetfull and is very stubborn and doesn't like people fussing over him. PT/OT was coming to the home, but that is now complete. His BS's have improved with moving the Insulin to the morning and he has not had any low BS's. His daughter-in-law state for now they are doing OK with him living with them. Pt denies any fever or chills. Pt today denies any HA, chest pain, or SOB. Pt denies any N/V/D/C or abdominal pain. Otherwise pt doing well on current tx and no other concerns today. The patient's past medical, surgical, social, and family history as well as his current medications and allergies were reviewed as documented in today's encounter. Current Outpatient Medications on File Prior to Visit   Medication Sig Dispense Refill    atorvastatin (LIPITOR) 20 MG tablet TAKE ONE TABLET BY MOUTH DAILY 90 tablet 3    Tiotropium Bromide-Olodaterol (STIOLTO RESPIMAT) 2.5-2.5 MCG/ACT AERS 2 puffs daily 3 Inhaler 3    fluticasone-umeclidin-vilant (TRELEGY ELLIPTA) 100-62.5-25 MCG/INH AEPB Inhale 1 puff into the lungs daily 1 each 11    Lancets Thin MISC 1 Device by Does not apply route daily 90 each 3    blood glucose test strips (TRUE METRIX BLOOD GLUCOSE TEST) strip 1 each by In Vitro route daily Testing 1 time daily 100 each 11    ammonium lactate (LAC-HYDRIN) 12 % cream Apply topically as needed.  1 Bottle 4    Insulin Degludec 100 UNIT/ML SOLN Inject 26 Units into the skin daily 60 mL 11    metoprolol succinate (TOPROL XL) 25 MG extended release tablet Take 0.5 tablets by mouth daily 45 tablet 3    allopurinol (ZYLOPRIM) 100 MG tablet TAKE 2 TABLETS DAILY 180 tablet 3    furosemide (LASIX) 40 MG tablet Take 1 tablet by mouth daily 90 tablet 3    pantoprazole (PROTONIX) 40 MG tablet Take 40 mg by mouth daily      polyethylene glycol (GLYCOLAX) powder Take 17 g by mouth daily as needed      warfarin (COUMADIN) 4 MG tablet Take 4 mg by mouth Per Jobst      aspirin 81 MG EC tablet Take 81 mg by mouth daily      acetaminophen (TYLENOL) 500 MG tablet Take 500 mg by mouth       No current facility-administered medications on file prior to visit. Subjective:     Review of Systems   Constitutional: Negative for appetite change, fatigue and fever. HENT: Negative for congestion, ear pain, rhinorrhea and sore throat. Respiratory: Negative for cough, chest tightness and shortness of breath. Cardiovascular: Positive for leg swelling. Negative for chest pain and palpitations. Gastrointestinal: Negative for abdominal distention, abdominal pain, constipation, diarrhea, nausea and vomiting. Genitourinary: Negative for difficulty urinating and dysuria. Musculoskeletal: Positive for arthralgias and gait problem (walks with a walker). Negative for back pain and myalgias. Skin: Negative for rash. Neurological: Negative for dizziness, weakness, light-headedness and headaches. Hematological: Negative for adenopathy. Psychiatric/Behavioral: Positive for confusion (and memory loss). Negative for behavioral problems and sleep disturbance. The patient is not nervous/anxious. Objective:     Physical Exam  Vitals signs reviewed. Constitutional:       General: He is not in acute distress. Appearance: He is well-developed. HENT:      Head: Normocephalic and atraumatic.       Right Ear: External ear normal.      Left Ear: External ear normal.      Nose: Nose normal.      Mouth/Throat: artery disease involving native coronary artery of native heart without angina pectoris  Comprehensive Metabolic Panel    Lipid Panel   9. Chronic gout without tophus, unspecified cause, unspecified site  Uric Acid   10. Iron deficiency anemia, unspecified iron deficiency anemia type  CBC   11. Pure hypercholesterolemia  Lipid Panel   12. Candidiasis  nystatin (MYCOSTATIN) 641730 UNIT/GM powder    left breast       Plan:     Orders Placed This Encounter   Procedures    CBC     Standing Status:   Future     Standing Expiration Date:   12/8/2021    Comprehensive Metabolic Panel     Standing Status:   Future     Standing Expiration Date:   12/8/2021    Hemoglobin A1C     Standing Status:   Future     Standing Expiration Date:   12/8/2021    Lipid Panel     Standing Status:   Future     Standing Expiration Date:   12/8/2021     Order Specific Question:   Is Patient Fasting?/# of Hours     Answer:   8-10 Hours    Uric Acid     Standing Status:   Future     Standing Expiration Date:   12/8/2021      Orders Placed This Encounter   Medications    memantine (NAMENDA) 10 MG tablet     Sig: Take 1 tablet by mouth 2 times daily     Dispense:  60 tablet     Refill:  11    nystatin (MYCOSTATIN) [id number] UNIT/GM powder     Sig: Apply 3 times daily. Dispense:  1 Bottle     Refill:  1     I again had a long d/w pt's daughter-in-law and I did state his condition is going to gradually get worse. I did offer a referral to Neurology for a 2nd opinion, but they are declining because they do no think it will change anything. They will let me know if they need any more help in the home.     Continue with ADA diet, current diabetic meds, and monitoring BS's as instructed    Will cont with the [place] [employer] Powder below his breasts, but I will refill the Nystatin Powder to have on hand if it flares up    Cont to stay well hydrated    Will cont with the Stiolto Respimat as prescribed    Rest of systems unchanged, continue current treatments. Medications, labs, diagnostic studies, consultations and follow-up as documented in this encounter. Rest of systems unchanged, continue current treatments    I have spent 30 minutes face to face with the patient more than 50% of this time was spent counseling and coordinating care. Kelsey Berger MD

## 2020-12-08 NOTE — PROGRESS NOTES
Visit Information    Have you changed or started any medications since your last visit including any over-the-counter medicines, vitamins, or herbal medicines? no   Have you stopped taking any of your medications? Is so, why? -  no  Are you having any side effects from any of your medications? - no    Have you seen any other physician or provider since your last visit?  no   Have you had any other diagnostic tests since your last visit?  no   Have you been seen in the emergency room and/or had an admission in a hospital since we last saw you?  no   Have you had your routine dental cleaning in the past 6 months?  no     Do you have an active MyChart account? If no, what is the barrier?   Yes    Patient Care Team:  Marily Carmona MD as PCP - General (Family Medicine)  Marily Carmona MD as PCP - [facility]  Oswaldo Diaz MD as Consulting Physician (Nephrology)  Oneyda Suazo MD as Consulting Physician (Urology)  Paulie Marin DPM as Consulting Physician (Podiatry)  Mi Adams MD as Consulting Physician (Cardiology)  Markos Guerra MD as Consulting Physician (Internal Medicine Cardiovascular Disease)  Phoebe Aragon MD as Consulting Physician (Cardiology)  Mike Aguilar MD as Consulting Physician (Internal Medicine)  Debora Lancaster MD as Surgeon (Vascular Surgery)  Katiuska Stubbs MD as Surgeon (Internal Medicine Cardiovascular Disease)  Norbert Solis as Consulting Physician (Thoracic Surgery)  Grady Sanchez DPM as Physician (Podiatry)    Medical History Review  Past Medical, Family, and Social History reviewed and does not contribute to the patient presenting condition    Health Maintenance   Topic Date Due    Shingles Vaccine (1 of 2) 05/14/1988    Annual Wellness Visit (AWV)  05/29/2019    Diabetic retinal exam  09/27/2020    DTaP/Tdap/Td vaccine (1 - Tdap) 11/18/2021 (Originally 5/14/1959)    A1C test (Diabetic or Prediabetic)  05/02/2021    Lipid

## 2021-01-19 ENCOUNTER — NURSE TRIAGE (OUTPATIENT)
Dept: OTHER | Facility: CLINIC | Age: 81
End: 2021-01-19

## 2021-01-19 NOTE — TELEPHONE ENCOUNTER
Reason for Disposition   Low blood glucose (< 70 mg/dL or 3.9 mmol/L) persists > 30 minutes AND using low blood sugar Care Advice    Answer Assessment - Initial Assessment Questions  1. SYMPTOMS: \"What symptoms are you concerned about? \"      Original triage was for tremors, when daughter mentioned her father's blood sugar was 20 before breakfast. After retaking it after he had eaten, it was 43.    2. ONSET:  \"When did the symptoms start? \"      The tremors began a week ago. The low blood sugars in the AM began 1/18. It was 27 in the AM 1/18 before breakfast, and 20 this AM before breakfast.    3. BLOOD GLUCOSE: \"What is your blood glucose level? \"       43, after eating oatmeal and drinking juice. 4. USUAL RANGE: \"What is your blood glucose level usually? \" (e.g., usual fasting morning value, usual evening value)      It is typically in the 100s. 5. TYPE 1 or 2:  \"Do you know what type of diabetes you have? \"  (e.g., Type 1, Type 2, Gestational; doesn't know)      Doesn't know    6. INSULIN: \"Do you take insulin? \" \"What type of insulin(s) do you use? What is the mode of delivery? (syringe, pen; injection or pump) \"When did you last give yourself an insulin dose? \" (i.e., time or hours/minutes ago) \"How much did you give? \" (i.e., how many units)      Tresiba, 26 units q daily pen. Did not inject today or yesterday. 7. DIABETES PILLS: \"Do you take any pills for your diabetes? \"      Daughter/caretaker denies. Pt did not answer her. 8. OTHER SYMPTOMS: \"Do you have any symptoms? \" (e.g., fever, frequent urination, difficulty breathing, vomiting)      Severe tremors that are felt by touching arm, grow worse with purposeful movement. Pt denies all symptoms. Daughter endorses foul smelling urine that is new. 9. LOW BLOOD GLUCOSE TREATMENT: \"What have you done so far to treat the low blood glucose level? \"  Pt had juice and oatmeal for his breakfast after the blood glucose of 20.  Writer instructed daughter to

## 2021-01-22 ENCOUNTER — TELEPHONE (OUTPATIENT)
Dept: FAMILY MEDICINE CLINIC | Age: 81
End: 2021-01-22

## 2021-01-22 NOTE — TELEPHONE ENCOUNTER
[address] called to make sure Dr. Josh Lund would follow the pt for home health. I let them know that she will follow.

## 2021-02-25 ENCOUNTER — HOSPITAL ENCOUNTER (OUTPATIENT)
Dept: ICU | Age: 81
End: 2021-02-25
Attending: INTERNAL MEDICINE | Admitting: INTERNAL MEDICINE

## 2021-02-25 LAB — PARTIAL THROMBOPLASTIN TIME: 80.1 SEC (ref 23.9–33.8)

## 2021-02-25 PROCEDURE — 85730 THROMBOPLASTIN TIME PARTIAL: CPT

## 2021-02-26 LAB
HAV IGM SER IA-ACNC: NONREACTIVE
HBV SURFACE AB TITR SER: <3.5 MIU/ML
HEPATITIS B CORE IGM ANTIBODY: NONREACTIVE
HEPATITIS B SURFACE ANTIGEN: NONREACTIVE
HEPATITIS C ANTIBODY: NONREACTIVE
PARTIAL THROMBOPLASTIN TIME: >150 SEC (ref 23.9–33.8)

## 2021-02-26 PROCEDURE — 86317 IMMUNOASSAY INFECTIOUS AGENT: CPT

## 2021-02-26 PROCEDURE — 83550 IRON BINDING TEST: CPT

## 2021-02-26 PROCEDURE — 82607 VITAMIN B-12: CPT

## 2021-02-26 PROCEDURE — 82728 ASSAY OF FERRITIN: CPT

## 2021-02-26 PROCEDURE — 80074 ACUTE HEPATITIS PANEL: CPT

## 2021-02-26 PROCEDURE — 82746 ASSAY OF FOLIC ACID SERUM: CPT

## 2021-02-26 PROCEDURE — 83540 ASSAY OF IRON: CPT

## 2021-02-26 PROCEDURE — 85730 THROMBOPLASTIN TIME PARTIAL: CPT

## 2021-02-27 LAB
FERRITIN: 1246 UG/L (ref 30–400)
FOLATE: 16 NG/ML
IRON SATURATION: 25 % (ref 20–55)
IRON: 39 UG/DL (ref 59–158)
PARTIAL THROMBOPLASTIN TIME: >150 SEC (ref 23.9–33.8)
TOTAL IRON BINDING CAPACITY: 154 UG/DL (ref 250–450)
UNSATURATED IRON BINDING CAPACITY: 115 UG/DL (ref 112–347)
VITAMIN B-12: 907 PG/ML (ref 232–1245)

## 2021-02-27 PROCEDURE — 82607 VITAMIN B-12: CPT

## 2021-02-27 PROCEDURE — 85730 THROMBOPLASTIN TIME PARTIAL: CPT

## 2021-02-27 PROCEDURE — 83550 IRON BINDING TEST: CPT

## 2021-02-27 PROCEDURE — 82746 ASSAY OF FOLIC ACID SERUM: CPT

## 2021-02-27 PROCEDURE — 83540 ASSAY OF IRON: CPT

## 2021-02-27 PROCEDURE — 82728 ASSAY OF FERRITIN: CPT

## 2021-02-28 LAB
ANION GAP SERPL CALCULATED.3IONS-SCNC: 10 MMOL/L (ref 9–17)
BUN BLDV-MCNC: 55 MG/DL (ref 8–23)
BUN/CREAT BLD: 28 (ref 9–20)
CALCIUM SERPL-MCNC: 9.2 MG/DL (ref 8.6–10.4)
CHLORIDE BLD-SCNC: 99 MMOL/L (ref 98–107)
CO2: 26 MMOL/L (ref 20–31)
CREAT SERPL-MCNC: 1.96 MG/DL (ref 0.7–1.2)
GFR AFRICAN AMERICAN: 40 ML/MIN
GFR NON-AFRICAN AMERICAN: 33 ML/MIN
GFR SERPL CREATININE-BSD FRML MDRD: ABNORMAL ML/MIN/{1.73_M2}
GFR SERPL CREATININE-BSD FRML MDRD: ABNORMAL ML/MIN/{1.73_M2}
GLUCOSE BLD-MCNC: 143 MG/DL (ref 70–99)
PARTIAL THROMBOPLASTIN TIME: 113.4 SEC (ref 23.9–33.8)
PARTIAL THROMBOPLASTIN TIME: >150 SEC (ref 23.9–33.8)
POTASSIUM SERPL-SCNC: 4 MMOL/L (ref 3.7–5.3)
SODIUM BLD-SCNC: 135 MMOL/L (ref 135–144)

## 2021-02-28 PROCEDURE — 80048 BASIC METABOLIC PNL TOTAL CA: CPT

## 2021-02-28 PROCEDURE — 85730 THROMBOPLASTIN TIME PARTIAL: CPT

## 2021-03-01 LAB
ALBUMIN SERPL-MCNC: 2.5 G/DL (ref 3.5–5.2)
ALBUMIN/GLOBULIN RATIO: ABNORMAL (ref 1–2.5)
ALP BLD-CCNC: 66 U/L (ref 40–129)
ALT SERPL-CCNC: 20 U/L (ref 5–41)
ANION GAP SERPL CALCULATED.3IONS-SCNC: 10 MMOL/L (ref 9–17)
AST SERPL-CCNC: 24 U/L
BILIRUB SERPL-MCNC: 0.55 MG/DL (ref 0.3–1.2)
BUN BLDV-MCNC: 72 MG/DL (ref 8–23)
BUN/CREAT BLD: 30 (ref 9–20)
CALCIUM SERPL-MCNC: 9.4 MG/DL (ref 8.6–10.4)
CHLORIDE BLD-SCNC: 98 MMOL/L (ref 98–107)
CO2: 27 MMOL/L (ref 20–31)
CREAT SERPL-MCNC: 2.43 MG/DL (ref 0.7–1.2)
GFR AFRICAN AMERICAN: 31 ML/MIN
GFR NON-AFRICAN AMERICAN: 26 ML/MIN
GFR SERPL CREATININE-BSD FRML MDRD: ABNORMAL ML/MIN/{1.73_M2}
GFR SERPL CREATININE-BSD FRML MDRD: ABNORMAL ML/MIN/{1.73_M2}
GLUCOSE BLD-MCNC: 168 MG/DL (ref 70–99)
INR BLD: 1.6
MAGNESIUM: 2 MG/DL (ref 1.6–2.6)
PARTIAL THROMBOPLASTIN TIME: 76.6 SEC (ref 23.9–33.8)
PARTIAL THROMBOPLASTIN TIME: 90.4 SEC (ref 23.9–33.8)
PHOSPHORUS: 3.7 MG/DL (ref 2.5–4.5)
POTASSIUM SERPL-SCNC: 3.9 MMOL/L (ref 3.7–5.3)
PROTHROMBIN TIME: 19.1 SEC (ref 11.5–14.2)
SODIUM BLD-SCNC: 135 MMOL/L (ref 135–144)
TOTAL PROTEIN: 7.1 G/DL (ref 6.4–8.3)

## 2021-03-01 PROCEDURE — 83735 ASSAY OF MAGNESIUM: CPT

## 2021-03-01 PROCEDURE — 84100 ASSAY OF PHOSPHORUS: CPT

## 2021-03-01 PROCEDURE — 85610 PROTHROMBIN TIME: CPT

## 2021-03-01 PROCEDURE — 80053 COMPREHEN METABOLIC PANEL: CPT

## 2021-03-01 PROCEDURE — 85730 THROMBOPLASTIN TIME PARTIAL: CPT

## 2021-03-02 LAB
ANION GAP SERPL CALCULATED.3IONS-SCNC: 11 MMOL/L (ref 9–17)
BUN BLDV-MCNC: 52 MG/DL (ref 8–23)
BUN/CREAT BLD: 27 (ref 9–20)
CALCIUM SERPL-MCNC: 9.1 MG/DL (ref 8.6–10.4)
CHLORIDE BLD-SCNC: 98 MMOL/L (ref 98–107)
CO2: 25 MMOL/L (ref 20–31)
CREAT SERPL-MCNC: 1.94 MG/DL (ref 0.7–1.2)
GFR AFRICAN AMERICAN: 40 ML/MIN
GFR NON-AFRICAN AMERICAN: 33 ML/MIN
GFR SERPL CREATININE-BSD FRML MDRD: ABNORMAL ML/MIN/{1.73_M2}
GFR SERPL CREATININE-BSD FRML MDRD: ABNORMAL ML/MIN/{1.73_M2}
GLUCOSE BLD-MCNC: 185 MG/DL (ref 70–99)
PARTIAL THROMBOPLASTIN TIME: 76 SEC (ref 23.9–33.8)
POTASSIUM SERPL-SCNC: 4 MMOL/L (ref 3.7–5.3)
SODIUM BLD-SCNC: 134 MMOL/L (ref 135–144)

## 2021-03-02 PROCEDURE — 80048 BASIC METABOLIC PNL TOTAL CA: CPT

## 2021-03-02 PROCEDURE — 85730 THROMBOPLASTIN TIME PARTIAL: CPT

## 2021-03-03 LAB
ALBUMIN SERPL-MCNC: 2.9 G/DL (ref 3.5–5.2)
ALBUMIN/GLOBULIN RATIO: ABNORMAL (ref 1–2.5)
ALP BLD-CCNC: 72 U/L (ref 40–129)
ALT SERPL-CCNC: 21 U/L (ref 5–41)
ANION GAP SERPL CALCULATED.3IONS-SCNC: 11 MMOL/L (ref 9–17)
AST SERPL-CCNC: 29 U/L
BILIRUB SERPL-MCNC: 0.63 MG/DL (ref 0.3–1.2)
BUN BLDV-MCNC: 68 MG/DL (ref 8–23)
BUN/CREAT BLD: 29 (ref 9–20)
CALCIUM SERPL-MCNC: 9.6 MG/DL (ref 8.6–10.4)
CHLORIDE BLD-SCNC: 98 MMOL/L (ref 98–107)
CO2: 25 MMOL/L (ref 20–31)
CREAT SERPL-MCNC: 2.34 MG/DL (ref 0.7–1.2)
GFR AFRICAN AMERICAN: 33 ML/MIN
GFR NON-AFRICAN AMERICAN: 27 ML/MIN
GFR SERPL CREATININE-BSD FRML MDRD: ABNORMAL ML/MIN/{1.73_M2}
GFR SERPL CREATININE-BSD FRML MDRD: ABNORMAL ML/MIN/{1.73_M2}
GLUCOSE BLD-MCNC: 145 MG/DL (ref 70–99)
MAGNESIUM: 2.1 MG/DL (ref 1.6–2.6)
PHOSPHORUS: 3.6 MG/DL (ref 2.5–4.5)
POTASSIUM SERPL-SCNC: 4.2 MMOL/L (ref 3.7–5.3)
SODIUM BLD-SCNC: 134 MMOL/L (ref 135–144)
TOTAL PROTEIN: 8 G/DL (ref 6.4–8.3)

## 2021-03-03 PROCEDURE — 84100 ASSAY OF PHOSPHORUS: CPT

## 2021-03-03 PROCEDURE — 83735 ASSAY OF MAGNESIUM: CPT

## 2021-03-03 PROCEDURE — 80053 COMPREHEN METABOLIC PANEL: CPT

## 2021-03-04 LAB
ANION GAP SERPL CALCULATED.3IONS-SCNC: 14 MMOL/L (ref 9–17)
BUN BLDV-MCNC: 82 MG/DL (ref 8–23)
BUN/CREAT BLD: 32 (ref 9–20)
CALCIUM SERPL-MCNC: 9.7 MG/DL (ref 8.6–10.4)
CHLORIDE BLD-SCNC: 97 MMOL/L (ref 98–107)
CO2: 27 MMOL/L (ref 20–31)
CREAT SERPL-MCNC: 2.54 MG/DL (ref 0.7–1.2)
GFR AFRICAN AMERICAN: 30 ML/MIN
GFR NON-AFRICAN AMERICAN: 24 ML/MIN
GFR SERPL CREATININE-BSD FRML MDRD: ABNORMAL ML/MIN/{1.73_M2}
GFR SERPL CREATININE-BSD FRML MDRD: ABNORMAL ML/MIN/{1.73_M2}
GLUCOSE BLD-MCNC: 205 MG/DL (ref 70–99)
POTASSIUM SERPL-SCNC: 4.1 MMOL/L (ref 3.7–5.3)
SODIUM BLD-SCNC: 138 MMOL/L (ref 135–144)

## 2021-03-04 PROCEDURE — 80048 BASIC METABOLIC PNL TOTAL CA: CPT

## 2021-03-07 LAB
HCT VFR BLD CALC: 36.3 % (ref 40.7–50.3)
HEMOGLOBIN: 11.5 G/DL (ref 13–17)
MCH RBC QN AUTO: 30.7 PG (ref 25.2–33.5)
MCHC RBC AUTO-ENTMCNC: 31.7 G/DL (ref 28.4–34.8)
MCV RBC AUTO: 97.1 FL (ref 82.6–102.9)
NRBC AUTOMATED: 0.1 PER 100 WBC
PDW BLD-RTO: 19.1 % (ref 11.8–14.4)
PLATELET # BLD: 282 K/UL (ref 138–453)
PMV BLD AUTO: 10.6 FL (ref 8.1–13.5)
PROCALCITONIN: 0.93 NG/ML
RBC # BLD: 3.74 M/UL (ref 4.21–5.77)
WBC # BLD: 23 K/UL (ref 3.5–11.3)

## 2021-03-07 PROCEDURE — 86403 PARTICLE AGGLUT ANTBDY SCRN: CPT

## 2021-03-07 PROCEDURE — 87205 SMEAR GRAM STAIN: CPT

## 2021-03-07 PROCEDURE — 87186 SC STD MICRODIL/AGAR DIL: CPT

## 2021-03-07 PROCEDURE — 87040 BLOOD CULTURE FOR BACTERIA: CPT

## 2021-03-07 PROCEDURE — 84145 PROCALCITONIN (PCT): CPT

## 2021-03-07 PROCEDURE — 87070 CULTURE OTHR SPECIMN AEROBIC: CPT

## 2021-03-07 PROCEDURE — 85027 COMPLETE CBC AUTOMATED: CPT

## 2021-03-08 LAB
-: ABNORMAL
AMORPHOUS: ABNORMAL
BACTERIA: ABNORMAL
BILIRUBIN URINE: NEGATIVE
CALCIUM IONIZED: 1.34 MMOL/L (ref 1.13–1.33)
CASTS UA: ABNORMAL /LPF
COLOR: YELLOW
COMMENT UA: ABNORMAL
CRYSTALS, UA: ABNORMAL /HPF
EPITHELIAL CELLS UA: ABNORMAL /HPF (ref 0–5)
GLUCOSE URINE: NEGATIVE
KETONES, URINE: NEGATIVE
LEUKOCYTE ESTERASE, URINE: NEGATIVE
MUCUS: ABNORMAL
NITRITE, URINE: NEGATIVE
OTHER OBSERVATIONS UA: ABNORMAL
PH UA: 5 (ref 5–8)
PROTEIN UA: ABNORMAL
PTH INTACT: 26.31 PG/ML (ref 15–65)
RBC UA: ABNORMAL /HPF (ref 0–2)
RENAL EPITHELIAL, UA: ABNORMAL /HPF
SPECIFIC GRAVITY UA: 1.02 (ref 1–1.03)
TRICHOMONAS: ABNORMAL
TURBIDITY: ABNORMAL
URINE HGB: ABNORMAL
UROBILINOGEN, URINE: NORMAL
VITAMIN D 25-HYDROXY: 42.6 NG/ML (ref 30–100)
WBC UA: ABNORMAL /HPF (ref 0–5)
YEAST: ABNORMAL

## 2021-03-08 PROCEDURE — 82306 VITAMIN D 25 HYDROXY: CPT

## 2021-03-08 PROCEDURE — 84155 ASSAY OF PROTEIN SERUM: CPT

## 2021-03-08 PROCEDURE — 81001 URINALYSIS AUTO W/SCOPE: CPT

## 2021-03-08 PROCEDURE — 82330 ASSAY OF CALCIUM: CPT

## 2021-03-08 PROCEDURE — 87086 URINE CULTURE/COLONY COUNT: CPT

## 2021-03-08 PROCEDURE — 84165 PROTEIN E-PHORESIS SERUM: CPT

## 2021-03-08 PROCEDURE — 83970 ASSAY OF PARATHORMONE: CPT

## 2021-03-08 PROCEDURE — 86334 IMMUNOFIX E-PHORESIS SERUM: CPT

## 2021-03-09 LAB
CULTURE: NO GROWTH
Lab: NORMAL
SPECIMEN DESCRIPTION: NORMAL

## 2021-03-09 PROCEDURE — 87086 URINE CULTURE/COLONY COUNT: CPT

## 2021-03-10 LAB
-: ABNORMAL
ABSOLUTE EOS #: 0 K/UL (ref 0–0.4)
ABSOLUTE IMMATURE GRANULOCYTE: 0.71 K/UL (ref 0–0.3)
ABSOLUTE LYMPH #: 0.94 K/UL (ref 1–4.8)
ABSOLUTE MONO #: 2.59 K/UL (ref 0.2–0.8)
AMORPHOUS: ABNORMAL
BACTERIA: ABNORMAL
BASOPHILS # BLD: 1 %
BASOPHILS ABSOLUTE: 0.24 K/UL (ref 0–0.2)
BILIRUBIN URINE: ABNORMAL
CASTS UA: ABNORMAL /LPF (ref 0–2)
CASTS UA: ABNORMAL /LPF (ref 0–2)
COLOR: ABNORMAL
COMMENT UA: ABNORMAL
CRYSTALS, UA: ABNORMAL /HPF
DIFFERENTIAL TYPE: ABNORMAL
EOSINOPHILS RELATIVE PERCENT: 0 % (ref 1–4)
EPITHELIAL CELLS UA: ABNORMAL /HPF (ref 0–5)
GLUCOSE URINE: NEGATIVE
HCT VFR BLD CALC: 41.1 % (ref 40.7–50.3)
HEMOGLOBIN: 12.4 G/DL (ref 13–17)
IMMATURE GRANULOCYTES: 3 %
KETONES, URINE: ABNORMAL
LEUKOCYTE ESTERASE, URINE: ABNORMAL
LYMPHOCYTES # BLD: 4 % (ref 24–44)
MCH RBC QN AUTO: 29.7 PG (ref 25.2–33.5)
MCHC RBC AUTO-ENTMCNC: 30.2 G/DL (ref 28.4–34.8)
MCV RBC AUTO: 98.6 FL (ref 82.6–102.9)
MONOCYTES # BLD: 11 % (ref 1–7)
MUCUS: ABNORMAL
NITRITE, URINE: NEGATIVE
NRBC AUTOMATED: 0.1 PER 100 WBC
OTHER OBSERVATIONS UA: ABNORMAL
PDW BLD-RTO: 19.5 % (ref 11.8–14.4)
PH UA: 5 (ref 5–8)
PLATELET # BLD: 291 K/UL (ref 138–453)
PLATELET ESTIMATE: ABNORMAL
PMV BLD AUTO: 10.7 FL (ref 8.1–13.5)
PROTEIN UA: ABNORMAL
RBC # BLD: 4.17 M/UL (ref 4.21–5.77)
RBC # BLD: ABNORMAL 10*6/UL
RBC UA: ABNORMAL /HPF (ref 0–2)
RENAL EPITHELIAL, UA: ABNORMAL /HPF
SEG NEUTROPHILS: 81 % (ref 36–66)
SEGMENTED NEUTROPHILS ABSOLUTE COUNT: 19.02 K/UL (ref 1.8–7.7)
SPECIFIC GRAVITY UA: 1.02 (ref 1–1.03)
TRICHOMONAS: ABNORMAL
TURBIDITY: ABNORMAL
URINE HGB: ABNORMAL
UROBILINOGEN, URINE: NORMAL
WBC # BLD: 23.5 K/UL (ref 3.5–11.3)
WBC # BLD: ABNORMAL 10*3/UL
WBC UA: ABNORMAL /HPF (ref 0–5)
YEAST: ABNORMAL

## 2021-03-10 PROCEDURE — 81001 URINALYSIS AUTO W/SCOPE: CPT

## 2021-03-10 PROCEDURE — 85025 COMPLETE CBC W/AUTO DIFF WBC: CPT

## 2021-03-11 LAB
ALBUMIN (CALCULATED): 3.3 G/DL (ref 3.2–5.2)
ALBUMIN PERCENT: 41 % (ref 45–65)
ALPHA 1 PERCENT: 4 % (ref 3–6)
ALPHA 2 PERCENT: 13 % (ref 6–13)
ALPHA-1-GLOBULIN: 0.3 G/DL (ref 0.1–0.4)
ALPHA-2-GLOBULIN: 1.1 G/DL (ref 0.5–0.9)
BETA GLOBULIN: 1.2 G/DL (ref 0.5–1.1)
BETA PERCENT: 15 % (ref 11–19)
CULTURE: ABNORMAL
CULTURE: ABNORMAL
CULTURE: NO GROWTH
DIRECT EXAM: ABNORMAL
GAMMA GLOBULIN %: 28 % (ref 9–20)
GAMMA GLOBULIN: 2.2 G/DL (ref 0.5–1.5)
Lab: ABNORMAL
Lab: NORMAL
PATHOLOGIST: ABNORMAL
PATHOLOGIST: NORMAL
PROTEIN ELECTROPHORESIS, SERUM: ABNORMAL
SERUM IFX INTERP: NORMAL
SPECIMEN DESCRIPTION: ABNORMAL
SPECIMEN DESCRIPTION: NORMAL
TOTAL PROT. SUM,%: 101 % (ref 98–102)
TOTAL PROT. SUM: 8.1 G/DL (ref 6.3–8.2)
TOTAL PROTEIN: 8.1 G/DL (ref 6.4–8.3)

## 2021-03-12 LAB
VANCOMYCIN RANDOM DATE LAST DOSE: NORMAL
VANCOMYCIN RANDOM DOSE AMOUNT: NORMAL
VANCOMYCIN RANDOM TIME LAST DOSE: NORMAL
VANCOMYCIN RANDOM: 21.7 UG/ML

## 2021-03-12 PROCEDURE — 80202 ASSAY OF VANCOMYCIN: CPT

## 2021-03-14 LAB
CULTURE: NORMAL
CULTURE: NORMAL
Lab: NORMAL
Lab: NORMAL
PROCALCITONIN: 1.12 NG/ML
SPECIMEN DESCRIPTION: NORMAL
SPECIMEN DESCRIPTION: NORMAL

## 2021-03-14 PROCEDURE — 84145 PROCALCITONIN (PCT): CPT

## 2021-03-16 LAB
ANION GAP SERPL CALCULATED.3IONS-SCNC: 23 MMOL/L (ref 9–17)
BUN BLDV-MCNC: 79 MG/DL (ref 8–23)
BUN/CREAT BLD: 25 (ref 9–20)
CALCIUM SERPL-MCNC: 9.6 MG/DL (ref 8.6–10.4)
CHLORIDE BLD-SCNC: 92 MMOL/L (ref 98–107)
CO2: 18 MMOL/L (ref 20–31)
CREAT SERPL-MCNC: 3.11 MG/DL (ref 0.7–1.2)
GFR AFRICAN AMERICAN: 23 ML/MIN
GFR NON-AFRICAN AMERICAN: 19 ML/MIN
GFR SERPL CREATININE-BSD FRML MDRD: ABNORMAL ML/MIN/{1.73_M2}
GFR SERPL CREATININE-BSD FRML MDRD: ABNORMAL ML/MIN/{1.73_M2}
GLUCOSE BLD-MCNC: 72 MG/DL (ref 70–99)
POTASSIUM SERPL-SCNC: 5.1 MMOL/L (ref 3.7–5.3)
SODIUM BLD-SCNC: 133 MMOL/L (ref 135–144)

## 2021-03-16 PROCEDURE — 80048 BASIC METABOLIC PNL TOTAL CA: CPT

## 2021-03-17 LAB
ALBUMIN SERPL-MCNC: 2.8 G/DL (ref 3.5–5.2)
ALBUMIN/GLOBULIN RATIO: ABNORMAL (ref 1–2.5)
ALP BLD-CCNC: 81 U/L (ref 40–129)
ALT SERPL-CCNC: 75 U/L (ref 5–41)
ANION GAP SERPL CALCULATED.3IONS-SCNC: 27 MMOL/L (ref 9–17)
AST SERPL-CCNC: 86 U/L
BILIRUB SERPL-MCNC: 0.65 MG/DL (ref 0.3–1.2)
BUN BLDV-MCNC: 110 MG/DL (ref 8–23)
BUN/CREAT BLD: 24 (ref 9–20)
CALCIUM SERPL-MCNC: 10.1 MG/DL (ref 8.6–10.4)
CHLORIDE BLD-SCNC: 92 MMOL/L (ref 98–107)
CO2: 16 MMOL/L (ref 20–31)
CREAT SERPL-MCNC: 4.66 MG/DL (ref 0.7–1.2)
GFR AFRICAN AMERICAN: 15 ML/MIN
GFR NON-AFRICAN AMERICAN: 12 ML/MIN
GFR SERPL CREATININE-BSD FRML MDRD: ABNORMAL ML/MIN/{1.73_M2}
GFR SERPL CREATININE-BSD FRML MDRD: ABNORMAL ML/MIN/{1.73_M2}
GLUCOSE BLD-MCNC: 156 MG/DL (ref 70–99)
MAGNESIUM: 3 MG/DL (ref 1.6–2.6)
PHOSPHORUS: 9.3 MG/DL (ref 2.5–4.5)
POTASSIUM SERPL-SCNC: 5.1 MMOL/L (ref 3.7–5.3)
SODIUM BLD-SCNC: 135 MMOL/L (ref 135–144)
TOTAL PROTEIN: 8.4 G/DL (ref 6.4–8.3)
VANCOMYCIN RANDOM DATE LAST DOSE: NORMAL
VANCOMYCIN RANDOM DOSE AMOUNT: NORMAL
VANCOMYCIN RANDOM TIME LAST DOSE: NORMAL
VANCOMYCIN RANDOM: 18.7 UG/ML

## 2021-03-17 PROCEDURE — 84100 ASSAY OF PHOSPHORUS: CPT

## 2021-03-17 PROCEDURE — 83735 ASSAY OF MAGNESIUM: CPT

## 2021-03-17 PROCEDURE — 80053 COMPREHEN METABOLIC PANEL: CPT

## 2021-03-17 PROCEDURE — 80202 ASSAY OF VANCOMYCIN: CPT
